# Patient Record
Sex: MALE | Race: BLACK OR AFRICAN AMERICAN | NOT HISPANIC OR LATINO | URBAN - METROPOLITAN AREA
[De-identification: names, ages, dates, MRNs, and addresses within clinical notes are randomized per-mention and may not be internally consistent; named-entity substitution may affect disease eponyms.]

---

## 2023-03-09 LAB
ALANINE AMINOTRANSFERASE (SGPT) (U/L) IN SER/PLAS: 10 U/L (ref 10–52)
ALBUMIN (G/DL) IN SER/PLAS: 4.1 G/DL (ref 3.4–5)
ALKALINE PHOSPHATASE (U/L) IN SER/PLAS: 88 U/L (ref 33–136)
ANION GAP IN SER/PLAS: 12 MMOL/L (ref 10–20)
ASPARTATE AMINOTRANSFERASE (SGOT) (U/L) IN SER/PLAS: 13 U/L (ref 9–39)
BASOPHILS (10*3/UL) IN BLOOD BY AUTOMATED COUNT: 0.03 X10E9/L (ref 0–0.1)
BASOPHILS/100 LEUKOCYTES IN BLOOD BY AUTOMATED COUNT: 0.5 % (ref 0–2)
BILIRUBIN TOTAL (MG/DL) IN SER/PLAS: 0.6 MG/DL (ref 0–1.2)
CALCIUM (MG/DL) IN SER/PLAS: 9.1 MG/DL (ref 8.6–10.6)
CARBON DIOXIDE, TOTAL (MMOL/L) IN SER/PLAS: 28 MMOL/L (ref 21–32)
CHLORIDE (MMOL/L) IN SER/PLAS: 101 MMOL/L (ref 98–107)
CHOLESTEROL (MG/DL) IN SER/PLAS: 113 MG/DL (ref 0–199)
CHOLESTEROL IN HDL (MG/DL) IN SER/PLAS: 30.3 MG/DL
CHOLESTEROL/HDL RATIO: 3.7
CREATININE (MG/DL) IN SER/PLAS: 0.75 MG/DL (ref 0.5–1.3)
EOSINOPHILS (10*3/UL) IN BLOOD BY AUTOMATED COUNT: 0.08 X10E9/L (ref 0–0.7)
EOSINOPHILS/100 LEUKOCYTES IN BLOOD BY AUTOMATED COUNT: 1.2 % (ref 0–6)
ERYTHROCYTE DISTRIBUTION WIDTH (RATIO) BY AUTOMATED COUNT: 12.5 % (ref 11.5–14.5)
ERYTHROCYTE MEAN CORPUSCULAR HEMOGLOBIN CONCENTRATION (G/DL) BY AUTOMATED: 34.3 G/DL (ref 32–36)
ERYTHROCYTE MEAN CORPUSCULAR VOLUME (FL) BY AUTOMATED COUNT: 87 FL (ref 80–100)
ERYTHROCYTES (10*6/UL) IN BLOOD BY AUTOMATED COUNT: 4.53 X10E12/L (ref 4.5–5.9)
ESTIMATED AVERAGE GLUCOSE FOR HBA1C: 131 MG/DL
GFR MALE: >90 ML/MIN/1.73M2
GLUCOSE (MG/DL) IN SER/PLAS: 178 MG/DL (ref 74–99)
HEMATOCRIT (%) IN BLOOD BY AUTOMATED COUNT: 39.4 % (ref 41–52)
HEMOGLOBIN (G/DL) IN BLOOD: 13.5 G/DL (ref 13.5–17.5)
HEMOGLOBIN A1C/HEMOGLOBIN TOTAL IN BLOOD: 6.2 %
IMMATURE GRANULOCYTES/100 LEUKOCYTES IN BLOOD BY AUTOMATED COUNT: 0.5 % (ref 0–0.9)
LDL: 59 MG/DL (ref 0–99)
LEUKOCYTES (10*3/UL) IN BLOOD BY AUTOMATED COUNT: 6.6 X10E9/L (ref 4.4–11.3)
LYMPHOCYTES (10*3/UL) IN BLOOD BY AUTOMATED COUNT: 1.37 X10E9/L (ref 1.2–4.8)
LYMPHOCYTES/100 LEUKOCYTES IN BLOOD BY AUTOMATED COUNT: 20.9 % (ref 13–44)
MAGNESIUM (MG/DL) IN SER/PLAS: 1.82 MG/DL (ref 1.6–2.4)
MONOCYTES (10*3/UL) IN BLOOD BY AUTOMATED COUNT: 0.61 X10E9/L (ref 0.1–1)
MONOCYTES/100 LEUKOCYTES IN BLOOD BY AUTOMATED COUNT: 9.3 % (ref 2–10)
NEUTROPHILS (10*3/UL) IN BLOOD BY AUTOMATED COUNT: 4.44 X10E9/L (ref 1.2–7.7)
NEUTROPHILS/100 LEUKOCYTES IN BLOOD BY AUTOMATED COUNT: 67.6 % (ref 40–80)
NRBC (PER 100 WBCS) BY AUTOMATED COUNT: 0 /100 WBC (ref 0–0)
PLATELETS (10*3/UL) IN BLOOD AUTOMATED COUNT: 198 X10E9/L (ref 150–450)
POTASSIUM (MMOL/L) IN SER/PLAS: 3.2 MMOL/L (ref 3.5–5.3)
PROTEIN TOTAL: 6.5 G/DL (ref 6.4–8.2)
SODIUM (MMOL/L) IN SER/PLAS: 138 MMOL/L (ref 136–145)
SYPHILIS TOTAL AB: REACTIVE
THYROTROPIN (MIU/L) IN SER/PLAS BY DETECTION LIMIT <= 0.05 MIU/L: 1.25 MIU/L (ref 0.44–3.98)
TRIGLYCERIDE (MG/DL) IN SER/PLAS: 120 MG/DL (ref 0–149)
UREA NITROGEN (MG/DL) IN SER/PLAS: 14 MG/DL (ref 6–23)
VLDL: 24 MG/DL (ref 0–40)

## 2023-03-10 LAB
RPR: ABNORMAL
SYPHILIS INTEGRATED INTERPRETATION: ABNORMAL
SYPHILIS TOTAL AB: REACTIVE
TREPONEMA PALLIDUM CONFIRMATION: NONREACTIVE
VDRL: NONREACTIVE

## 2023-07-05 LAB
ALANINE AMINOTRANSFERASE (SGPT) (U/L) IN SER/PLAS: 9 U/L (ref 10–52)
ALBUMIN (G/DL) IN SER/PLAS: 4.2 G/DL (ref 3.4–5)
ALKALINE PHOSPHATASE (U/L) IN SER/PLAS: 85 U/L (ref 33–136)
ANION GAP IN SER/PLAS: 16 MMOL/L (ref 10–20)
ASPARTATE AMINOTRANSFERASE (SGOT) (U/L) IN SER/PLAS: 13 U/L (ref 9–39)
BILIRUBIN TOTAL (MG/DL) IN SER/PLAS: 0.4 MG/DL (ref 0–1.2)
CALCIUM (MG/DL) IN SER/PLAS: 9.6 MG/DL (ref 8.6–10.6)
CARBON DIOXIDE, TOTAL (MMOL/L) IN SER/PLAS: 26 MMOL/L (ref 21–32)
CHLORIDE (MMOL/L) IN SER/PLAS: 102 MMOL/L (ref 98–107)
CREATININE (MG/DL) IN SER/PLAS: 0.8 MG/DL (ref 0.5–1.3)
ESTIMATED AVERAGE GLUCOSE FOR HBA1C: 111 MG/DL
GFR MALE: >90 ML/MIN/1.73M2
GLUCOSE (MG/DL) IN SER/PLAS: 82 MG/DL (ref 74–99)
HEMOGLOBIN A1C/HEMOGLOBIN TOTAL IN BLOOD: 5.5 %
HEPATITIS A VIRUS IGM AB PRESENCE IN SER/PLAS BY IMMUNOASSAY: NONREACTIVE
HEPATITIS B VIRUS CORE IGM AB PRESENCE IN SER/PLAS BY IMMUNOASSY: NONREACTIVE
HEPATITIS B VIRUS SURFACE AG PRESENCE IN SERUM: NONREACTIVE
HEPATITIS C VIRUS AB PRESENCE IN SERUM: NONREACTIVE
HIV 1/ 2 AG/AB SCREEN: NONREACTIVE
POTASSIUM (MMOL/L) IN SER/PLAS: 3.6 MMOL/L (ref 3.5–5.3)
PROTEIN TOTAL: 7 G/DL (ref 6.4–8.2)
SODIUM (MMOL/L) IN SER/PLAS: 140 MMOL/L (ref 136–145)
UREA NITROGEN (MG/DL) IN SER/PLAS: 22 MG/DL (ref 6–23)

## 2023-07-13 ENCOUNTER — APPOINTMENT (OUTPATIENT)
Dept: LAB | Facility: LAB | Age: 68
End: 2023-07-13
Payer: COMMERCIAL

## 2023-07-13 LAB
ALBUMIN (MG/L) IN URINE: <7 MG/L
ALBUMIN/CREATININE (UG/MG) IN URINE: NORMAL UG/MG CRT (ref 0–30)
CREATININE (MG/DL) IN URINE: 114 MG/DL (ref 20–370)

## 2024-03-15 ENCOUNTER — TELEPHONE (OUTPATIENT)
Dept: PRIMARY CARE | Facility: HOSPITAL | Age: 69
End: 2024-03-15
Payer: MEDICARE

## 2024-03-15 DIAGNOSIS — E11.9 TYPE 2 DIABETES MELLITUS WITHOUT COMPLICATION, WITHOUT LONG-TERM CURRENT USE OF INSULIN (MULTI): Primary | ICD-10-CM

## 2024-03-15 DIAGNOSIS — E11.9 TYPE 2 DIABETES MELLITUS WITHOUT COMPLICATION, WITHOUT LONG-TERM CURRENT USE OF INSULIN (MULTI): ICD-10-CM

## 2024-03-15 RX ORDER — METFORMIN HYDROCHLORIDE 500 MG/1
500 TABLET ORAL
Qty: 100 TABLET | Refills: 3 | Status: SHIPPED | OUTPATIENT
Start: 2024-03-15 | End: 2024-03-19 | Stop reason: SDUPTHER

## 2024-03-15 NOTE — TELEPHONE ENCOUNTER
Patient's care taker called to state patient is completely out of Metformin    [facility]

## 2024-03-19 RX ORDER — METFORMIN HYDROCHLORIDE 500 MG/1
500 TABLET ORAL 2 TIMES DAILY
Qty: 60 TABLET | Refills: 0 | Status: SHIPPED | OUTPATIENT
Start: 2024-03-19 | End: 2024-03-29 | Stop reason: SDUPTHER

## 2024-03-19 NOTE — TELEPHONE ENCOUNTER
"Spoke with care provider at group home. Nurse said pt taking 500 mg BID. Per last note in chart, mentioned \"starting Metformin\". Unclear exactly what dose is (refill from earlier in March says 500 mg every day whereas nurse at home says 500 mg BID. Last A1c 5.5. Will order 500 mg BID for 1 month. Has follow up later in March 2024 scheduled. At that time can recheck A1c and determine optimal dose    Signed,  Onel Padilla MD PGY-2  Internal Medicine-Pediatrics    "

## 2024-03-19 NOTE — TELEPHONE ENCOUNTER
Patient staff called stating the patient takes metformin twice daily prescription was sent for morning only

## 2024-03-29 ENCOUNTER — LAB (OUTPATIENT)
Dept: LAB | Facility: LAB | Age: 69
End: 2024-03-29
Payer: MEDICARE

## 2024-03-29 ENCOUNTER — OFFICE VISIT (OUTPATIENT)
Dept: PRIMARY CARE | Facility: HOSPITAL | Age: 69
End: 2024-03-29
Payer: MEDICARE

## 2024-03-29 VITALS
SYSTOLIC BLOOD PRESSURE: 146 MMHG | HEART RATE: 86 BPM | HEIGHT: 63 IN | DIASTOLIC BLOOD PRESSURE: 78 MMHG | TEMPERATURE: 96.9 F | BODY MASS INDEX: 31.36 KG/M2 | OXYGEN SATURATION: 95 % | WEIGHT: 177 LBS

## 2024-03-29 DIAGNOSIS — R39.15 URINARY URGENCY: ICD-10-CM

## 2024-03-29 DIAGNOSIS — E11.9 TYPE 2 DIABETES MELLITUS WITHOUT COMPLICATION, WITHOUT LONG-TERM CURRENT USE OF INSULIN (MULTI): ICD-10-CM

## 2024-03-29 DIAGNOSIS — Z00.00 HEALTHCARE MAINTENANCE: Primary | ICD-10-CM

## 2024-03-29 DIAGNOSIS — I10 HYPERTENSION, UNSPECIFIED TYPE: ICD-10-CM

## 2024-03-29 DIAGNOSIS — Z00.00 HEALTHCARE MAINTENANCE: ICD-10-CM

## 2024-03-29 LAB
ALBUMIN SERPL BCP-MCNC: 4.3 G/DL (ref 3.4–5)
ALP SERPL-CCNC: 79 U/L (ref 33–136)
ALT SERPL W P-5'-P-CCNC: 12 U/L (ref 10–52)
ANION GAP SERPL CALC-SCNC: 14 MMOL/L (ref 10–20)
APPEARANCE UR: CLEAR
AST SERPL W P-5'-P-CCNC: 12 U/L (ref 9–39)
BILIRUB SERPL-MCNC: 0.5 MG/DL (ref 0–1.2)
BILIRUB UR STRIP.AUTO-MCNC: NEGATIVE MG/DL
BUN SERPL-MCNC: 12 MG/DL (ref 6–23)
CALCIUM SERPL-MCNC: 9.3 MG/DL (ref 8.6–10.6)
CHLORIDE SERPL-SCNC: 101 MMOL/L (ref 98–107)
CO2 SERPL-SCNC: 27 MMOL/L (ref 21–32)
COLOR UR: YELLOW
CREAT SERPL-MCNC: 0.76 MG/DL (ref 0.5–1.3)
EGFRCR SERPLBLD CKD-EPI 2021: >90 ML/MIN/1.73M*2
ERYTHROCYTE [DISTWIDTH] IN BLOOD BY AUTOMATED COUNT: 13 % (ref 11.5–14.5)
EST. AVERAGE GLUCOSE BLD GHB EST-MCNC: 117 MG/DL
GLUCOSE SERPL-MCNC: 169 MG/DL (ref 74–99)
GLUCOSE UR STRIP.AUTO-MCNC: ABNORMAL MG/DL
HBA1C MFR BLD: 5.7 %
HCT VFR BLD AUTO: 38.4 % (ref 41–52)
HGB BLD-MCNC: 13.5 G/DL (ref 13.5–17.5)
KETONES UR STRIP.AUTO-MCNC: NEGATIVE MG/DL
LEUKOCYTE ESTERASE UR QL STRIP.AUTO: NEGATIVE
MCH RBC QN AUTO: 31.1 PG (ref 26–34)
MCHC RBC AUTO-ENTMCNC: 35.2 G/DL (ref 32–36)
MCV RBC AUTO: 89 FL (ref 80–100)
NITRITE UR QL STRIP.AUTO: NEGATIVE
NRBC BLD-RTO: 0 /100 WBCS (ref 0–0)
PH UR STRIP.AUTO: 6 [PH]
PLATELET # BLD AUTO: 172 X10*3/UL (ref 150–450)
POTASSIUM SERPL-SCNC: 3.3 MMOL/L (ref 3.5–5.3)
PROT SERPL-MCNC: 6.8 G/DL (ref 6.4–8.2)
PROT UR STRIP.AUTO-MCNC: NEGATIVE MG/DL
RBC # BLD AUTO: 4.34 X10*6/UL (ref 4.5–5.9)
RBC # UR STRIP.AUTO: NEGATIVE /UL
SODIUM SERPL-SCNC: 139 MMOL/L (ref 136–145)
SP GR UR STRIP.AUTO: 1.02
UROBILINOGEN UR STRIP.AUTO-MCNC: ABNORMAL MG/DL
WBC # BLD AUTO: 6.3 X10*3/UL (ref 4.4–11.3)

## 2024-03-29 PROCEDURE — 1159F MED LIST DOCD IN RCRD: CPT

## 2024-03-29 PROCEDURE — 36415 COLL VENOUS BLD VENIPUNCTURE: CPT

## 2024-03-29 PROCEDURE — 85027 COMPLETE CBC AUTOMATED: CPT

## 2024-03-29 PROCEDURE — 99214 OFFICE O/P EST MOD 30 MIN: CPT

## 2024-03-29 PROCEDURE — 1126F AMNT PAIN NOTED NONE PRSNT: CPT

## 2024-03-29 PROCEDURE — 3077F SYST BP >= 140 MM HG: CPT

## 2024-03-29 PROCEDURE — 1160F RVW MEDS BY RX/DR IN RCRD: CPT

## 2024-03-29 PROCEDURE — 3044F HG A1C LEVEL LT 7.0%: CPT

## 2024-03-29 PROCEDURE — 3008F BODY MASS INDEX DOCD: CPT

## 2024-03-29 PROCEDURE — 1036F TOBACCO NON-USER: CPT

## 2024-03-29 PROCEDURE — 80053 COMPREHEN METABOLIC PANEL: CPT

## 2024-03-29 PROCEDURE — 99214 OFFICE O/P EST MOD 30 MIN: CPT | Mod: GC

## 2024-03-29 PROCEDURE — 83036 HEMOGLOBIN GLYCOSYLATED A1C: CPT

## 2024-03-29 PROCEDURE — 81003 URINALYSIS AUTO W/O SCOPE: CPT

## 2024-03-29 PROCEDURE — 3078F DIAST BP <80 MM HG: CPT

## 2024-03-29 RX ORDER — RISPERIDONE 0.5 MG/1
0.5 TABLET ORAL NIGHTLY
COMMUNITY
Start: 2024-03-07 | End: 2024-03-29 | Stop reason: SDUPTHER

## 2024-03-29 RX ORDER — METFORMIN HYDROCHLORIDE 500 MG/1
500 TABLET ORAL 2 TIMES DAILY
Qty: 60 TABLET | Refills: 11 | Status: SHIPPED | OUTPATIENT
Start: 2024-03-29 | End: 2025-03-29

## 2024-03-29 RX ORDER — AMLODIPINE BESYLATE 10 MG/1
10 TABLET ORAL DAILY
Qty: 30 TABLET | Refills: 11 | Status: SHIPPED | OUTPATIENT
Start: 2024-03-29 | End: 2025-03-29

## 2024-03-29 RX ORDER — ATORVASTATIN CALCIUM 20 MG/1
20 TABLET, FILM COATED ORAL NIGHTLY
COMMUNITY
Start: 2020-03-10 | End: 2024-03-29 | Stop reason: SDUPTHER

## 2024-03-29 RX ORDER — CHLORTHALIDONE 25 MG/1
25 TABLET ORAL DAILY
COMMUNITY
Start: 2022-03-30 | End: 2024-03-29 | Stop reason: SDUPTHER

## 2024-03-29 RX ORDER — RISPERIDONE 0.5 MG/1
0.5 TABLET ORAL NIGHTLY
Qty: 30 TABLET | Refills: 11 | Status: SHIPPED | OUTPATIENT
Start: 2024-03-29 | End: 2025-03-29

## 2024-03-29 RX ORDER — CHLORTHALIDONE 25 MG/1
25 TABLET ORAL DAILY
Qty: 30 TABLET | Refills: 11 | Status: SHIPPED | OUTPATIENT
Start: 2024-03-29 | End: 2025-03-29

## 2024-03-29 RX ORDER — AMLODIPINE BESYLATE 10 MG/1
10 TABLET ORAL DAILY
COMMUNITY
Start: 2020-03-10 | End: 2024-03-29 | Stop reason: SDUPTHER

## 2024-03-29 RX ORDER — TROSPIUM CHLORIDE 20 MG/1
20 TABLET, FILM COATED ORAL NIGHTLY
Qty: 30 TABLET | Refills: 5 | Status: SHIPPED | OUTPATIENT
Start: 2024-03-29 | End: 2024-09-25

## 2024-03-29 RX ORDER — TROSPIUM CHLORIDE 20 MG/1
20 TABLET, FILM COATED ORAL DAILY
Qty: 30 TABLET | Refills: 5 | Status: SHIPPED | OUTPATIENT
Start: 2024-03-29 | End: 2024-03-29 | Stop reason: SDUPTHER

## 2024-03-29 RX ORDER — ATORVASTATIN CALCIUM 20 MG/1
20 TABLET, FILM COATED ORAL NIGHTLY
Qty: 30 TABLET | Refills: 11 | Status: SHIPPED | OUTPATIENT
Start: 2024-03-29 | End: 2025-03-29

## 2024-03-29 ASSESSMENT — PATIENT HEALTH QUESTIONNAIRE - PHQ9
SUM OF ALL RESPONSES TO PHQ9 QUESTIONS 1 & 2: 0
2. FEELING DOWN, DEPRESSED OR HOPELESS: NOT AT ALL
1. LITTLE INTEREST OR PLEASURE IN DOING THINGS: NOT AT ALL

## 2024-03-29 ASSESSMENT — ENCOUNTER SYMPTOMS
OCCASIONAL FEELINGS OF UNSTEADINESS: 0
DEPRESSION: 0
LOSS OF SENSATION IN FEET: 0

## 2024-03-29 ASSESSMENT — PAIN SCALES - GENERAL: PAINLEVEL: 0-NO PAIN

## 2024-03-29 NOTE — PATIENT INSTRUCTIONS
Hi Mr Valles,       Thanks for coming in, these are some important things to remember from your visit:   1) I ordered you some lab work today, please make sure to get those labs drawn at a  lab.   2) I have ordered you a urine lab to make sure there is no infection. If there is none, we will prescribe a medication to help with urinary urgency. Please also make sure to document every time you have an episode of urination and you don't make it to the toilet in time. Please also try to time your voids - try to go to the bathroom at least 4 times daily so that you are not rushing to go any other time.   3) Please come back for follow-up visit in 6 months.  4) Please let us know if you have any questions; you can call us at      Thanks,    Dr. Ricci

## 2024-03-29 NOTE — PROGRESS NOTES
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 Pt was last seen in clinic July 2023.      Pt states he is doing fine, he has no concerns. He is here with 2 of his caretakers. They are requesting medication refills.     Pt states that when he has to go to bathroom, he sometimes urinates before getting to toilet. His caretakers have noticed this as well.     ROS: 12 point ROS negative unless as per HPI         Current Outpatient Medications:     amLODIPine (Norvasc) 10 mg tablet, Take 1 tablet (10 mg) by mouth once daily., Disp: 30 tablet, Rfl: 11    atorvastatin (Lipitor) 20 mg tablet, Take 1 tablet (20 mg) by mouth once daily at bedtime., Disp: 30 tablet, Rfl: 11    chlorthalidone (Hygroton) 25 mg tablet, Take 1 tablet (25 mg) by mouth once daily., Disp: 30 tablet, Rfl: 11    metFORMIN (Glucophage) 500 mg tablet, Take 1 tablet (500 mg) by mouth 2 times a day., Disp: 60 tablet, Rfl: 11    risperiDONE (RisperDAL) 0.5 mg tablet, Take 1 tablet (0.5 mg) by mouth once daily at bedtime., Disp: 30 tablet, Rfl: 11     PHYSICAL EXAM:   General: pt is pleasant, cooperative, in NAD  Heart: RRR, no murmur, rub or Jeancarlos  Lungs: clear to auscultation bilaterally with good airflow throughout. No wheezes or rhonchi.  Abdomen: soft, nontender, nondistended, no apparent mass  Extremities: no edema bilaterally  Skin: no rash or lesions; warm and dry   Psychiatric: mental status and behavior appropriate for situation   Neurologic: Normal gait and stance. Normal speech character and tone. No apparent focal deficits.       Musculoskeletal: moving all extremities appropriately.    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 Pt was  last seen in clinic July 2023.         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      #HTN  -slightly elevated bp today (146/78), normal at last visit   -will re-assess BP at next visit   -c/w amlodipine 10mg  -c/w chlorthalidone 25mg daily   -both above meds refilled today      #Dyslipidemia  -cont atorvastatin 20 mg - refilled today      #pre-diabetes   ::latest A1c 5.5% (07/23)  -c/w metformin 500 mg BID - refilled today      #Hx Hallucination & pedophilia   -followed by psychiatry (Nery Monteiro at [facility], last seen in April 2022)  -on risperidone 0.5 mg - refilled today       #HM   -PCV 3/2020   -COVID vaccines x2   -colonoscopy done 2020, next colonoscopy 2025  -Lung cancer screening: not indicated, never smoker   -A1C: 5.5 (7/23)   -Tspot negative 08/21 (for adult  clearance)  -Influenza: Last Nov 2020   -TDAP: 2021   -HIV : neg (7/2023)  -Hep B+C: neg (7/2023)       Return to clinic in 6 months for follow-up         Blanquita Ricci MD   IM PGY-2

## 2024-03-31 NOTE — PROGRESS NOTES
I reviewed the resident/fellow's documentation and discussed the patient with the resident/fellow. I agree with the resident/fellow's medical decision making as documented in the note.     Courtney Hutchinson MD MPH       no

## 2024-09-18 ENCOUNTER — OFFICE VISIT (OUTPATIENT)
Dept: PRIMARY CARE | Facility: HOSPITAL | Age: 69
End: 2024-09-18
Payer: MEDICARE

## 2024-09-18 ENCOUNTER — DOCUMENTATION (OUTPATIENT)
Dept: PRIMARY CARE | Facility: HOSPITAL | Age: 69
End: 2024-09-18
Payer: MEDICARE

## 2024-09-18 VITALS
HEIGHT: 64 IN | TEMPERATURE: 96.6 F | OXYGEN SATURATION: 98 % | BODY MASS INDEX: 29.88 KG/M2 | HEART RATE: 86 BPM | SYSTOLIC BLOOD PRESSURE: 136 MMHG | DIASTOLIC BLOOD PRESSURE: 74 MMHG | WEIGHT: 175 LBS

## 2024-09-18 DIAGNOSIS — Z00.00 HEALTHCARE MAINTENANCE: Primary | ICD-10-CM

## 2024-09-18 PROCEDURE — 1123F ACP DISCUSS/DSCN MKR DOCD: CPT

## 2024-09-18 PROCEDURE — 3008F BODY MASS INDEX DOCD: CPT

## 2024-09-18 PROCEDURE — 1126F AMNT PAIN NOTED NONE PRSNT: CPT

## 2024-09-18 PROCEDURE — 1158F ADVNC CARE PLAN TLK DOCD: CPT

## 2024-09-18 PROCEDURE — 99213 OFFICE O/P EST LOW 20 MIN: CPT | Mod: GC

## 2024-09-18 PROCEDURE — 99213 OFFICE O/P EST LOW 20 MIN: CPT

## 2024-09-18 PROCEDURE — 1159F MED LIST DOCD IN RCRD: CPT

## 2024-09-18 PROCEDURE — 1036F TOBACCO NON-USER: CPT

## 2024-09-18 ASSESSMENT — ENCOUNTER SYMPTOMS
LOSS OF SENSATION IN FEET: 0
OCCASIONAL FEELINGS OF UNSTEADINESS: 0
DEPRESSION: 0

## 2024-09-18 ASSESSMENT — PATIENT HEALTH QUESTIONNAIRE - PHQ9
1. LITTLE INTEREST OR PLEASURE IN DOING THINGS: NOT AT ALL
2. FEELING DOWN, DEPRESSED OR HOPELESS: NOT AT ALL
SUM OF ALL RESPONSES TO PHQ9 QUESTIONS 1 AND 2: 0

## 2024-09-18 ASSESSMENT — PAIN SCALES - GENERAL: PAINLEVEL: 0-NO PAIN

## 2024-09-18 NOTE — PROGRESS NOTES
Advanced care planning discussed at this visit.  Patient does not currently have a Healthcare Power of  or Living Will in place. He does express that his sister Toya Valles has his permission to act as his surrogate decision maker in the event of an emergency. Patient advised to bring in POA, Living Will and Advanced Care Plan for his chart if he  obtains one.  Patient declined POA & Living Will documentation as well as an informational sheet today.

## 2024-09-18 NOTE — PROGRESS NOTES
Chief complaint: Follow Up Visit    HPI:  Fabio Valles is a 69 y.o. 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 Pt was last seen in clinic on March 2024. At that visit his chief complaint was urinary incontinence at night for which he was prescribed Trospium 20mg at bedtime. Today he reports that this has adequately resolved his symptoms. No episodes of nighttime urination.     Otherwise Mr. Valles has no complaints. He is here for follow up maintenance visit.     Medications:    Current Outpatient Medications:     amLODIPine (Norvasc) 10 mg tablet, Take 1 tablet (10 mg) by mouth once daily., Disp: 30 tablet, Rfl: 11    atorvastatin (Lipitor) 20 mg tablet, Take 1 tablet (20 mg) by mouth once daily at bedtime., Disp: 30 tablet, Rfl: 11    chlorthalidone (Hygroton) 25 mg tablet, Take 1 tablet (25 mg) by mouth once daily., Disp: 30 tablet, Rfl: 11    risperiDONE (RisperDAL) 0.5 mg tablet, Take 1 tablet (0.5 mg) by mouth once daily at bedtime., Disp: 30 tablet, Rfl: 11    trospium (Sanctura) 20 mg tablet, TAKE 1 TAB BY MOUTH EVERY DAY AT BEDTIME, Disp: 20 tablet, Rfl: 10    Allergies:  Not on File    Review of systems:  Constitutional: negative for fevers, chills, weight loss, weight gain, change in appetite, fatigue, weakness.  HEENT: negative for headache, changes in vision or hearing, congestion, sore throat.  Respiratory: negative for SOB, cough, hemoptysis, wheezing  Cardiovascular: negative for chest pain, palpitations, orthopnea, PND  GI: negative for dysphagia, abdominal pain, nausea, vomiting, diarrhea, constipation, melena, hematochezia, BRBPR  : negative for frequency, urgency, dysuria, hematuria, incontinence  MSK: negative for myalgia, arthralgia, decreased joint ROM, LE swelling  Skin: negative for rash, wounds  Heme/lymph: negative for easy bruising, bleeding, epistaxis  Neuro: negative for LOC, numbness,  tingling, tremor, vertigo, dizziness    Vitals:  Vitals:    09/18/24 1357   BP: 136/74   Pulse: 86   Temp: 35.9 °C (96.6 °F)   SpO2: 98%       Physical exam:  Constitutional: Well-developed male in no acute distress.  HEENT: Normocephalic, atraumatic. PERRL. EOMI. No cervical lymphadenopathy.  Respiratory: CTA bilaterally. No wheezes, rales, or rhonchi. Normal respiratory effort.  Cardiovascular: RRR. No murmurs, gallops, or rubs. No JVD. Radial pulses 2+.  Abdominal: Soft, nondistended, nontender to palpation. Bowel sounds present. No hepatosplenomegaly or masses. No CVA tenderness.  Neuro: CN II-XII intact. UE and LE strength 5/5 bilaterally and sensation intact. Normal FTN testing.  MSK: No LE edema bilaterally.  Skin: Warm, dry. No rashes or wounds.  Psych: Appropriate mood and affect.    Labs:  No results found for this or any previous visit (from the past 24 hour(s)).    Imaging:  No results found.    Assessment and plan:  Fabio Valles is a 69 y.o. 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 Pt was last seen in clinic on March 2024 for which he was px Trospium for night time incontinence. Today he has no complaints, and incontinence has improved.     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 loss component  Left ear Sensorineural hearing loss with restricted hearing right ear  - R>L, ear canals clear  - No concerns today in office regarding his hearing    #HTN  -slightly elevated bp today (136/74), 146/78 at last visit  - Home BP log   -c/w amlodipine 10mg  -c/w chlorthalidone 25mg daily      #Dyslipidemia  - Lipids last checked 6/2024, LDL 50, HDL 27  - Cont atorvastatin 20 mg      #pre-diabetes   ::latest A1c 5.5% (07/23)  -  Discontinue metformin 500 mg BID     #Hx Hallucination & pedophilia   -followed by psychiatry (Nery Pereirae at [facility], last seen in June 2024)  -on risperidone 0.5 mg       #Health Maintenance  #Screening:  -Cardiovascular: 10 year risk 20.7%. Patient is on anti-HTN and statin medications per guidelines. Advised patient to emphasize whole food diet and limit processed food intake.   -Diabetes: A1C 5.7 (3/2024)  -Cancer:   Colorectal: Due for Cscope in 2025  Lung: N/A  #Behavioral Counseling:   Tobacco/smoking: Never smoker  Alcohol: Never drinker  Diet/exercise: No exercise, diet balanced  #Immunizations:  - Pneumococcal: [id number] dose completed 3/2020  - Flu: Will get at home pharmacy along with Shingrix and RSV  - COVID: Primary series completed  - RSV: Will consider at pharmacy today  - Tdap: Up to date, 2021  - Zoster: Encouraged to get at pharmacy    RTC in  6 months.    Patient and plan discussed with attending physician, Dr. Terrell .    Lexa Rees MD  PGY-1 Internal Medicine  [facility]

## 2024-09-18 NOTE — PATIENT INSTRUCTIONS
Mr. Valles,    As discussed today, our plan is:     Labs - we collected labs today and will call you with any abnormal results. If you have any questions or concerns prior to us calling feel free to call our office to have your questions addressed.   Medication changes: Discontinued Metformin.  Consultations - you were referred to see the following specialists: N/A. You should receive a call from central scheduling in the next few days if you do not receive a call within 3-5 business days please call [phone] to schedule your appointment.   4.   If you smoke or use other tobacco products, take steps to quit. Call [phone] for more information or to set up an appointment with  [facility]ing [facility]. The [place] Tobacco Quit Line is a free resource for people who don’t have insurance, receive Medicaid, pregnant women, or members of the [place] Collabor[place]. Call 0-447-QUIT-NOW or 1-663.665.3201.    Please come back to see us in: 6 months   ------  If you have any problems or questions, please contact the clinic at [phone] to leave a message. Our fax number is [phone]. If your issue cannot wait until the next business day, please go to urgent care or the emergency department.     I also strongly urge all of my patients to register for [employer] by going to: [email or web address]  (The  staff can also send you a text/email link to register when you check out).    No shows: It is understandable if you are unable to make it to a visit, but please cancel your appointment instead of not showing up. This helps to give other patients access to primary care and keeps wait times low.        [facility]   [phone]    unable to assess

## 2024-12-06 ENCOUNTER — TELEPHONE (OUTPATIENT)
Dept: PRIMARY CARE | Facility: HOSPITAL | Age: 69
End: 2024-12-06
Payer: MEDICARE

## 2024-12-06 NOTE — TELEPHONE ENCOUNTER
Attempted to call the patient but was unable to get through.     I was requested to order metformin 500mg but based on chart review, it is unclear whether patient has been taking this or not, and at what dose. Per last note, this was discontinued, and he has not had labs drawn in a while.     I will request our clinic staff to schedule an appointment with him so that he can have labs drawn and the optimal metformin dose can be determined.     Ely Brink MD  PGY2 Internal Medicine  [facility]

## 2024-12-12 ENCOUNTER — APPOINTMENT (OUTPATIENT)
Dept: PRIMARY CARE | Facility: HOSPITAL | Age: 69
End: 2024-12-12
Payer: MEDICARE

## 2025-01-07 ENCOUNTER — OFFICE VISIT (OUTPATIENT)
Dept: PRIMARY CARE | Facility: HOSPITAL | Age: 70
End: 2025-01-07
Payer: MEDICARE

## 2025-01-07 VITALS
SYSTOLIC BLOOD PRESSURE: 146 MMHG | HEART RATE: 101 BPM | BODY MASS INDEX: 29.88 KG/M2 | WEIGHT: 175 LBS | OXYGEN SATURATION: 98 % | HEIGHT: 64 IN | TEMPERATURE: 96.7 F | DIASTOLIC BLOOD PRESSURE: 84 MMHG

## 2025-01-07 DIAGNOSIS — N39.42 URINARY INCONTINENCE WITHOUT SENSORY AWARENESS: ICD-10-CM

## 2025-01-07 DIAGNOSIS — R73.03 PRE-DIABETES: ICD-10-CM

## 2025-01-07 DIAGNOSIS — I10 HYPERTENSION, UNSPECIFIED TYPE: Primary | ICD-10-CM

## 2025-01-07 LAB
APPEARANCE UR: CLEAR
BACTERIA #/AREA URNS AUTO: ABNORMAL /HPF
BILIRUB UR STRIP.AUTO-MCNC: NEGATIVE MG/DL
COLOR UR: ABNORMAL
EST. AVERAGE GLUCOSE BLD GHB EST-MCNC: 381 MG/DL
GLUCOSE UR STRIP.AUTO-MCNC: ABNORMAL MG/DL
HBA1C MFR BLD: 14.9 %
KETONES UR STRIP.AUTO-MCNC: NEGATIVE MG/DL
LEUKOCYTE ESTERASE UR QL STRIP.AUTO: ABNORMAL
MUCOUS THREADS #/AREA URNS AUTO: ABNORMAL /LPF
NITRITE UR QL STRIP.AUTO: NEGATIVE
PH UR STRIP.AUTO: 5.5 [PH]
PROT UR STRIP.AUTO-MCNC: NEGATIVE MG/DL
RBC # UR STRIP.AUTO: ABNORMAL /UL
RBC #/AREA URNS AUTO: >20 /HPF
SP GR UR STRIP.AUTO: 1.03
SQUAMOUS #/AREA URNS AUTO: ABNORMAL /HPF
UROBILINOGEN UR STRIP.AUTO-MCNC: NORMAL MG/DL
WBC #/AREA URNS AUTO: ABNORMAL /HPF
YEAST BUDDING #/AREA UR COMP ASSIST: PRESENT /HPF

## 2025-01-07 PROCEDURE — 87086 URINE CULTURE/COLONY COUNT: CPT

## 2025-01-07 PROCEDURE — 83036 HEMOGLOBIN GLYCOSYLATED A1C: CPT

## 2025-01-07 PROCEDURE — 36415 COLL VENOUS BLD VENIPUNCTURE: CPT

## 2025-01-07 PROCEDURE — 81001 URINALYSIS AUTO W/SCOPE: CPT

## 2025-01-07 RX ORDER — TAMSULOSIN HYDROCHLORIDE 0.4 MG/1
0.4 CAPSULE ORAL DAILY
Qty: 30 CAPSULE | Refills: 11 | Status: SHIPPED | OUTPATIENT
Start: 2025-01-07 | End: 2026-01-07

## 2025-01-07 RX ORDER — LOSARTAN POTASSIUM 25 MG/1
25 TABLET ORAL DAILY
Qty: 30 TABLET | Refills: 11 | Status: SHIPPED | OUTPATIENT
Start: 2025-01-07 | End: 2025-01-07

## 2025-01-07 RX ORDER — LOSARTAN POTASSIUM 25 MG/1
50 TABLET ORAL DAILY
Qty: 60 TABLET | Refills: 11 | Status: SHIPPED | OUTPATIENT
Start: 2025-01-07 | End: 2026-01-07

## 2025-01-07 ASSESSMENT — PATIENT HEALTH QUESTIONNAIRE - PHQ9
2. FEELING DOWN, DEPRESSED OR HOPELESS: NOT AT ALL
1. LITTLE INTEREST OR PLEASURE IN DOING THINGS: NOT AT ALL
SUM OF ALL RESPONSES TO PHQ9 QUESTIONS 1 AND 2: 0

## 2025-01-07 ASSESSMENT — ENCOUNTER SYMPTOMS
LOSS OF SENSATION IN FEET: 0
DEPRESSION: 0
OCCASIONAL FEELINGS OF UNSTEADINESS: 0

## 2025-01-07 ASSESSMENT — PAIN SCALES - GENERAL: PAINLEVEL_OUTOF10: 0-NO PAIN

## 2025-01-07 NOTE — PROGRESS NOTES
Chief complaint:    HPI:  Fabio Valles is a 69 y.o. 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 Per care taker, he will go often during day and night, sometimes up to 3 times per hour. They often find him soiled.        Medications:    Current Outpatient Medications:     amLODIPine (Norvasc) 10 mg tablet, Take 1 tablet (10 mg) by mouth once daily., Disp: 30 tablet, Rfl: 11    atorvastatin (Lipitor) 20 mg tablet, Take 1 tablet (20 mg) by mouth once daily at bedtime., Disp: 30 tablet, Rfl: 11    losartan (Cozaar) 25 mg tablet, Take 2 tablets (50 mg) by mouth once daily., Disp: 60 tablet, Rfl: 11    risperiDONE (RisperDAL) 0.5 mg tablet, Take 1 tablet (0.5 mg) by mouth once daily at bedtime., Disp: 30 tablet, Rfl: 11    tamsulosin (Flomax) 0.4 mg 24 hr capsule, Take 1 capsule (0.4 mg) by mouth once daily., Disp: 30 capsule, Rfl: 11    trospium (Sanctura) 20 mg tablet, TAKE 1 TAB BY MOUTH EVERY DAY AT BEDTIME, Disp: 20 tablet, Rfl: 10    Allergies:  No Known Allergies    Review of systems:  Constitutional: negative for fevers, chills, weight loss, weight gain, change in appetite, fatigue, weakness.  HEENT: negative for headache, changes in vision or hearing, congestion, sore throat.  Respiratory: negative for SOB, cough, hemoptysis, wheezing  Cardiovascular: negative for chest pain, palpitations, orthopnea, PND  GI: negative for dysphagia, abdominal pain, nausea, vomiting, diarrhea, constipation, melena, hematochezia, BRBPR  : negative for frequency, urgency, dysuria, hematuria, incontinence  MSK: negative for myalgia, arthralgia, decreased joint ROM, LE swelling  Skin: negative for rash, wounds  Heme/lymph: negative for easy bruising, bleeding, epistaxis  Neuro: negative for LOC, numbness, tingling, tremor, vertigo, dizziness    Vitals:  Vitals:    01/07/25 1436   BP: 146/84    Pulse: 101   Temp: 35.9 °C (96.7 °F)   SpO2: 98%       Physical exam:  Constitutional: Well-developed male in no acute distress.  HEENT: Normocephalic, atraumatic. PERRL. EOMI. No cervical lymphadenopathy.  Respiratory: CTA bilaterally. No wheezes, rales, or rhonchi. Normal respiratory effort.  Cardiovascular: RRR. No murmurs, gallops, or rubs. No JVD. Radial pulses 2+.  Abdominal: Soft, nondistended, nontender to palpation. Bowel sounds present. No hepatosplenomegaly or masses. No CVA tenderness.  Neuro: CN II-XII intact. UE and LE strength 5/5 bilaterally and sensation intact. Normal FTN testing.  MSK: No LE edema bilaterally.  Skin: Warm, dry. No rashes or wounds.  Psych: Appropriate mood and affect.    Labs:  No results found for this or any previous visit (from the past 24 hours).    Imaging:  No results found.    Assessment and plan:  Fabio Valles is a 69 y.o. 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 Per care taker, he will go often during day and night, sometimes up to 3 times per hour. They often find him soiled. Etiology is likely multifactorial with inability to completely rule out overactive bladder vs. BPH vs. UTI.     Changes/Orders today:  - Start Losartan 25mg BID  - Discontinue Chlorthalidone 25mg daily  - Start Tamulosin 0.4mg nightly  - Urinalysis with reflex to culture  - Recheck A1C     #Urinary Urgency   - Since last visit incontinence has worsened  - Patient has polyuria and increased frequency, with nocturia  - Denies dysuria, foul smelling urine  PLAN:  - Continue Trospium at night time  - UA with reflex to culture  -  Start Tamulosin 0.4mg nightly  - Discontinue Chlorthalidone       #HTN  -slightly elevated bp today (136/74), 146/78 at last visit  - Home BP log   PLAN:  -c/w amlodipine 10mg  - Start taking Losartan 25 mg BID  -Discontinue  chlorthalidone 25mg daily      #Dyslipidemia  - Lipids last checked 6/2024, LDL 50, HDL 27  PLAN:  - Cont atorvastatin 20 mg      #pre-diabetes   ::latest A1c 5.5% (07/23)  PLAN:  - Recheck A1C today  - Discontinue metformin 500 mg BID    #Hearing loss  -Follows w ENT, last seen in April 2023: Mixed hearing loss right ear with greater conductive hearing loss component  Left ear Sensorineural hearing loss with restricted hearing right ear  - R>L, ear canals clear  - No concerns today in office regarding his hearing       #Hx Hallucination & pedophilia   -followed by psychiatry (Nery Monteiro at [facility], last seen in June 2024)  -on risperidone 0.5 mg       #Health Maintenance  #Screening:  -Cardiovascular: 10 year risk 20.7%. Patient is on anti-HTN and statin medications per guidelines. Advised patient to emphasize whole food diet and limit processed food intake.   -Diabetes: A1C 5.7 (3/2024)  -Cancer:   Colorectal: Due for Cscope in 2025  Lung: N/A  #Behavioral Counseling:   Tobacco/smoking: Never smoker  Alcohol: Never drinker  Diet/exercise: No exercise, diet balanced  #Immunizations:  - Pneumococcal: [id number] dose completed 3/2020  - Flu: Will get at home pharmacy along with Shingrix and RSV  - COVID: Primary series completed  - RSV: Will consider at pharmacy today  - Tdap: Up to date, 2021  - Zoster: Encouraged to get at pharmacy     Follow up visit in 2 weeks to assess symptoms after changes made today.     Patient and plan discussed with attending physician, Dr. Terrell .     Lexa Rees MD  PGY-1 Internal Medicine  [facility]

## 2025-01-07 NOTE — PATIENT INSTRUCTIONS
As discussed today, our plan is:     Labs - we collected labs today and will call you with any abnormal results. If you have any questions or concerns prior to us calling feel free to call our office to have your questions addressed.   Medication changes: STOP taking Chlorthalidone 25mg. START taking Losartan 25 mg pill twice per day, one in AM and one in PM. START taking Tamsulosin (FLOmax) 0.4mg nightly.   Consultations - you were not referred to see specialists.  4.   If you smoke or use other tobacco products, take steps to quit. Call [phone] for more information or to set up an appointment with  T[facility] Counseling [facility]. The [place] Tobacco Quit Line is a free resource for people who don’t have insurance, receive Medicaid, pregnant women, or members of the [place] Collabor[place]. Call 6-282-QUIT-NOW or 1-811.325.7292.    Please come back to see us in: 2 weeks   ------  If you have any problems or questions, please contact the clinic at [phone] to leave a message. Our fax number is [phone]. If your issue cannot wait until the next business day, please go to urgent care or the emergency department.     I also strongly urge all of my patients to register for [employer] by going to: [email or web address]  (The  staff can also send you a text/email link to register when you check out).    No shows: It is understandable if you are unable to make it to a visit, but please cancel your appointment instead of not showing up. This helps to give other patients access to primary care and keeps wait times low.        [facility]   [phone]

## 2025-01-08 LAB
BACTERIA UR CULT: NORMAL
HOLD SPECIMEN: NORMAL

## 2025-01-21 ENCOUNTER — TELEMEDICINE (OUTPATIENT)
Dept: PRIMARY CARE | Facility: HOSPITAL | Age: 70
End: 2025-01-21
Payer: MEDICARE

## 2025-01-21 DIAGNOSIS — E11.9 TYPE 2 DIABETES MELLITUS WITHOUT COMPLICATION, WITHOUT LONG-TERM CURRENT USE OF INSULIN (MULTI): Primary | ICD-10-CM

## 2025-01-21 RX ORDER — METFORMIN HYDROCHLORIDE 500 MG/1
500 TABLET ORAL
Qty: 60 TABLET | Refills: 13 | Status: SHIPPED | OUTPATIENT
Start: 2025-01-21 | End: 2026-02-25

## 2025-01-21 NOTE — PROGRESS NOTES
Primary Care Internal Medicine Clinic Note     Subjective     Fabio Valles is a 69 y.o. 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    Interval history:  -last seen 2 weeks ago in C due to increased urinary frequency and incontinence. At this appointment, Chlorthalidone was discontinued and replaced by losartan, and Flomax 0.4 mg was started due to concern for BPH.   - UA collected with 4+ glucose, Hgb A1c 14.9 % from 5.7 % one month prior. No ketones on UA    Virtual visit today to discuss next steps.    - He was able to obtain endocrine appointment 02/03/2025 to establish care    ROS collected from patient and caregiver:    - drinking mainly water, sugar free juices    Polyuria: Yes  Polydipsia: Yes  Respirations: normal  Weight loss: no  Fatigue: no  Diarrhea: no  Vomiting: no  Abdominal pain: no  Cramping: no           Objective   Visit Vitals  Smoking Status Never       Physical exam:  VIRTUAL TELEPHONE VISIT UNABLE TO COMPLETE PHYSICAL EXAM  Medications:  Current Outpatient Medications   Medication Instructions    amLODIPine (NORVASC) 10 mg, oral, Daily    atorvastatin (LIPITOR) 20 mg, oral, Nightly    losartan (COZAAR) 50 mg, oral, Daily    risperiDONE (RISPERDAL) 0.5 mg, oral, Nightly    tamsulosin (FLOMAX) 0.4 mg, oral, Daily    trospium (SANCTURA) 20 mg, oral, Nightly        Allergies:  No Known Allergies          Assessment/Plan    69 y.o. male with PMH of dyslipidemia, HTN, intellectual disability (lives at a nursing home), and pedophilia who presents to the clinic for FUV after presenting with increased urinary frequency and found to have acute rise in A1c to 14.9% from 5.7% a few months prior.     This FU was to see efficacy of Flomax in treating his increased urinary frequency, however, it is now apparent that urinary frequency is due to severely uncontrolled diabetes that is not yet treated.    #Type 2  DM  - previously on metformin 500 mg BID, discontinued a few weeks ago. Had urinary urgency and incontinence in the past when A1c was < 6%. Represented 1/7/25 with increased urinary incontinence, A1c at this time 14.9%.   - Some suspicion for pancreatic etiology given acute rise and no obvious change in diet that would lead to such high A1c  - Endocrine appointment 02/03/2025  - Resume Metformin 500 mg BID  - check RFP  - Nutrition referral     Emailed plan to [email or web address] per caregiver request. Plan including new medications, labs and FU appointments reviewed with caregiver.    RTC in 3 months  Plan discussed with Dr. Trisha Kaiser MD  Internal Medicine Resident   [facility]  P [phone]  F [phone]

## 2025-01-21 NOTE — PATIENT INSTRUCTIONS
Thank you for attending your virtual appointment.     Your labs from your last appointment show that your blood sugar has been very high and that you have diabetes. This explains why you are urinating more frequently.    You have an appointment with Endocrinology on February 3 2025. Please come to this appointment to begin full treatment for your diabetes.       Schedule an appointment with your primary care doctor in 3 months.    Please call office if you have any questions or concerns: [phone]  [facility] Fax: [phone]

## 2025-01-28 NOTE — PROGRESS NOTES
I reviewed the resident/fellow's documentation and discussed the patient with the resident/fellow. I agree with the resident/fellow's medical decision making as documented in the note.    69 year old male with history of dyslipidemia, HTN, pre-diabetes, intellectual disability who lives in a nursing home coming today with complaint of urinary incontinence.  Need to consider if he has progressed to diabetes although his last hemoglobin A1C was 5.7%.  Also suspect some BPH.  Plan was to hold his metformin as he is on track with his sugars and will start flomax.  Also send hemoglobin A1C to make sure that this is not a sign of osmosis and higher sugars.  Prevention and screening addressed per resident note.      Ana Lilia Petty MD

## 2025-01-28 NOTE — PROGRESS NOTES
I reviewed the resident/fellow's documentation and discussed the patient with the resident/fellow. I agree with the resident/fellow's medical decision making as documented in the note.    Case d/w Dr. Rees; agree with his A/P. I have personally reviewed the plan with the pt as well. Would recheck A1C to make sure pt's A1C has not increased off of Metformin, causing glucoseuria/polyuria. Would stop Chlorthalidone and switch to Losartan and would possibly start Tamsulosin in case pt's symptoms are due to BPH.    Leslee Ferrell MD

## 2025-02-03 ENCOUNTER — OFFICE VISIT (OUTPATIENT)
Dept: ENDOCRINOLOGY | Facility: HOSPITAL | Age: 70
End: 2025-02-03
Payer: MEDICARE

## 2025-02-03 VITALS
DIASTOLIC BLOOD PRESSURE: 75 MMHG | TEMPERATURE: 97.5 F | HEART RATE: 94 BPM | BODY MASS INDEX: 27.46 KG/M2 | WEIGHT: 160 LBS | SYSTOLIC BLOOD PRESSURE: 135 MMHG

## 2025-02-03 DIAGNOSIS — R73.9 HYPERGLYCEMIA: ICD-10-CM

## 2025-02-03 DIAGNOSIS — E11.9 TYPE 2 DIABETES MELLITUS WITHOUT COMPLICATION, WITHOUT LONG-TERM CURRENT USE OF INSULIN (MULTI): Primary | ICD-10-CM

## 2025-02-03 LAB — GLUCOSE BLD MANUAL STRIP-MCNC: 383 MG/DL (ref 74–99)

## 2025-02-03 PROCEDURE — 3075F SYST BP GE 130 - 139MM HG: CPT | Performed by: STUDENT IN AN ORGANIZED HEALTH CARE EDUCATION/TRAINING PROGRAM

## 2025-02-03 PROCEDURE — 1123F ACP DISCUSS/DSCN MKR DOCD: CPT | Performed by: STUDENT IN AN ORGANIZED HEALTH CARE EDUCATION/TRAINING PROGRAM

## 2025-02-03 PROCEDURE — 99215 OFFICE O/P EST HI 40 MIN: CPT | Performed by: STUDENT IN AN ORGANIZED HEALTH CARE EDUCATION/TRAINING PROGRAM

## 2025-02-03 PROCEDURE — 82947 ASSAY GLUCOSE BLOOD QUANT: CPT | Performed by: STUDENT IN AN ORGANIZED HEALTH CARE EDUCATION/TRAINING PROGRAM

## 2025-02-03 PROCEDURE — 1159F MED LIST DOCD IN RCRD: CPT | Performed by: STUDENT IN AN ORGANIZED HEALTH CARE EDUCATION/TRAINING PROGRAM

## 2025-02-03 PROCEDURE — 3046F HEMOGLOBIN A1C LEVEL >9.0%: CPT | Performed by: STUDENT IN AN ORGANIZED HEALTH CARE EDUCATION/TRAINING PROGRAM

## 2025-02-03 PROCEDURE — 3078F DIAST BP <80 MM HG: CPT | Performed by: STUDENT IN AN ORGANIZED HEALTH CARE EDUCATION/TRAINING PROGRAM

## 2025-02-03 PROCEDURE — 4010F ACE/ARB THERAPY RXD/TAKEN: CPT | Performed by: STUDENT IN AN ORGANIZED HEALTH CARE EDUCATION/TRAINING PROGRAM

## 2025-02-03 RX ORDER — BLOOD-GLUCOSE CONTROL, NORMAL
EACH MISCELLANEOUS
Qty: 200 EACH | Refills: 11 | Status: SHIPPED | OUTPATIENT
Start: 2025-02-03

## 2025-02-03 RX ORDER — INSULIN GLARGINE 100 [IU]/ML
INJECTION, SOLUTION SUBCUTANEOUS
Qty: 20 ML | Refills: 3 | Status: SHIPPED | OUTPATIENT
Start: 2025-02-03

## 2025-02-03 RX ORDER — INSULIN PUMP SYRINGE, 3 ML
EACH MISCELLANEOUS
Qty: 1 KIT | Refills: 1 | Status: SHIPPED | OUTPATIENT
Start: 2025-02-03

## 2025-02-03 RX ORDER — BLOOD-GLUCOSE,RECEIVER,CONT
EACH MISCELLANEOUS
Qty: 1 EACH | Refills: 0 | Status: SHIPPED | OUTPATIENT
Start: 2025-02-03

## 2025-02-03 RX ORDER — BLOOD-GLUCOSE METER
EACH MISCELLANEOUS
Qty: 200 STRIP | Refills: 3 | Status: SHIPPED | OUTPATIENT
Start: 2025-02-03

## 2025-02-03 RX ORDER — BLOOD-GLUCOSE SENSOR
EACH MISCELLANEOUS
Qty: 3 EACH | Refills: 11 | Status: SHIPPED | OUTPATIENT
Start: 2025-02-03

## 2025-02-03 ASSESSMENT — ENCOUNTER SYMPTOMS
LOSS OF SENSATION IN FEET: 0
OCCASIONAL FEELINGS OF UNSTEADINESS: 0
DEPRESSION: 0

## 2025-02-03 ASSESSMENT — PATIENT HEALTH QUESTIONNAIRE - PHQ9
SUM OF ALL RESPONSES TO PHQ9 QUESTIONS 1 AND 2: 0
1. LITTLE INTEREST OR PLEASURE IN DOING THINGS: NOT AT ALL
2. FEELING DOWN, DEPRESSED OR HOPELESS: NOT AT ALL

## 2025-02-03 ASSESSMENT — COLUMBIA-SUICIDE SEVERITY RATING SCALE - C-SSRS
6. HAVE YOU EVER DONE ANYTHING, STARTED TO DO ANYTHING, OR PREPARED TO DO ANYTHING TO END YOUR LIFE?: NO
1. IN THE PAST MONTH, HAVE YOU WISHED YOU WERE DEAD OR WISHED YOU COULD GO TO SLEEP AND NOT WAKE UP?: NO
2. HAVE YOU ACTUALLY HAD ANY THOUGHTS OF KILLING YOURSELF?: NO

## 2025-02-03 NOTE — PROGRESS NOTES
Chief complaint: T2DM    HPI:  Fabio Valles is a 69 y.o. male with PMH of DLD, HTN, intellectual disability (lives at group home) presenting to [facility] care with endocrinology clinic. History from patient and supervisors from group home.    Patient was seen by [facility] primary care on 09/18/2024. His primary complaint at this visit was urinary frequency/incontinence. He was treated with trospium and flomax and his metformin was discontinued since his sugars appeared to be at goal. He was seen again on 01/07/2025  with continued urinary incontinence and frequency. Labs ordered at this visit showed HbA1c of 14.9% increased from 5.7% in 03/2024. Patient had virtual follow-up visit on 1/21/25 with [facility] primary care clinic. He was noted to have polydipsia and polyuria and was drinking water, sugar free juices, denied other sxs at the time. His metformin 500 mg BID was restarted at this visit and he was referred to nutrition.     Per patient/caregiver, patient was on metformin and then sugars got better so his metformin was discontinued. He started eating more sugar but has cut back since finding out his sugars were poorly controlled. His group home supervisors say that there is not great control over what patient eats at the group home. He reports that he is still drinking a lot of water and sugar free juice. Patient is still having urinary incontinence (had an accident last week) but going less frequently than he was at beginning of January. Patient is unaware of any family history of diabetes. When asked for diet recall, patient reports eating cereal (frosted flakes) for breakfast. He did not eat lunch yesterday and had meatloaf, macaroni and cheese, dressing, and cornbread for dinner. He is walking for exercise. Patient has not yet seen nutrition. They also have not yet received diabetes supplies so they have not been checking sugars at home. Supervisor reports that patient follows with ophthalmology and is to have a  follow-up appointment in 1 month.    Review of systems is positive for polydipsia and polyuria and negative for fevers/chills, fatigue, weight changes, shortness of breath, nausea, vomiting, abdominal pain, numbness/tingling, and leg swelling.    Medications:  Current Outpatient Medications   Medication Instructions    amLODIPine (NORVASC) 10 mg, oral, Daily    atorvastatin (LIPITOR) 20 mg, oral, Nightly    losartan (COZAAR) 50 mg, oral, Daily    metFORMIN (GLUCOPHAGE) 500 mg, oral, 2 times daily (morning and late afternoon)    risperiDONE (RISPERDAL) 0.5 mg, oral, Nightly    tamsulosin (FLOMAX) 0.4 mg, oral, Daily    trospium (SANCTURA) 20 mg, oral, Nightly       Allergies:  No Known Allergies    Past medical history:  No past medical history on file.    Surgical history:  No past surgical history on file.    Family history:  No family history on file.    Social history:   reports that he has never smoked. He has never used smokeless tobacco. He reports that he does not drink alcohol and does not use drugs.    Vitals:  Vitals:    02/03/25 0918   BP: 135/75   Pulse: 94   Temp: 36.4 °C (97.5 °F)     Physical exam:  General: well-appearing, no acute distress, resting comfortably in bed  HEENT: normocephalic, atraumatic  Cardio: regular rate and rhythm, normal S1 and S2, no murmurs  Pulm: clear to auscultation bilaterally, no wheezes, crackles, or rhonchi, breathing comfortably on room air  Abd: normal, active bowel sounds; soft, non-tender, non-distended  Extremities: no peripheral edema, scars, or lesions  Neuro: alert and oriented to person, place, and time, CN II-XII grossly intact  Psych: mood and affect are appropriate    Labs:  Lab Results   Component Value Date    WBC 6.3 03/29/2024    HGB 13.5 03/29/2024    HCT 38.4 (L) 03/29/2024    MCV 89 03/29/2024     03/29/2024       Lab Results   Component Value Date    GLUCOSE 169 (H) 03/29/2024    CALCIUM 9.3 03/29/2024     03/29/2024    K 3.3 (L)  "03/29/2024    CO2 27 03/29/2024     03/29/2024    BUN 12 03/29/2024    CREATININE 0.76 03/29/2024       Lab Results   Component Value Date    HGBA1C 14.9 (H) 01/07/2025        Lab Results   Component Value Date    CHOL 113 03/09/2023    CHOL 96 08/03/2021    CHOL 117 11/06/2020     Lab Results   Component Value Date    HDL 30.3 (A) 03/09/2023    HDL 36.3 (A) 08/03/2021    HDL 34.0 (A) 11/06/2020     No results found for: \"LDLCALC\"  Lab Results   Component Value Date    TRIG 120 03/09/2023    TRIG 51 08/03/2021    TRIG 145 11/06/2020     No components found for: \"CHOLHDL\"    Other notable labs:  A1c 14.9  UA 4+ glucose, trace blood, 500 leuk esterase  Microscopic: budding yeast, 1+ bacteria, 11-20 WBC, > 20 RBC  POC glucose in clinic is 383    Assessment and plan:  Fabio Valles is a 69 y.o. male with PMH of DLD, HTN, intellectual disability (lives at nursing home) presenting to establish care with endocrinology clinic for newly diagnosed T2DM (last A1c 14.9 1/2025, on metformin).    #T2DM  Poorly controlled, newly diagnosed T2DM. A1c in 1/2025 is 14.9, previously 5.7 last March. Patient was on metformin prior to most recent A1c. Sugar in office is 383. Patient having polydipsia and polyuria though improved from early January. Otherwise asymptomatic.   Plan   - continue metformin 500 mg BID; if Cr wnl on RFP, then increase metformin to 1000 mg BID  - start insulin glargine 15 units, if sugars < 90 in the AM or at night, drop insulin by 2 units every 2-3 days, supervisor instructed to call endo clinic if sugars are > 250  - start januvia 100 mg daily  - ordered CGM w reader and glucometer/strips  - ordered RFP, lipid panel, urine albumin-Cr ratio  - caregivers to schedule appointment with nutrition  - continue follow-up with ophthalmology    Follow-up in 4 weeks.    Patient and plan discussed with attending physician Dr. Saavedra.    Nikki Weiner MD  Internal Medicine PGY1  "

## 2025-02-03 NOTE — PATIENT INSTRUCTIONS
As discussed today, our plan is:     Labs - we ordered labs today - please stop by the lab to get them collected. If you have any questions or concerns prior to us calling feel free to call our office to have your questions addressed.   Medication changes:   - start insulin glargine 15 units daily (can be in the morning, just ensure same time each day). If blood sugar measurement shows < 90 in the morning, decrease insulin glargine by 2 units every 2-3 days.  - start januvia 100 mg daily  - continue metformin 500 mg twice a day  We have ordered a continuous glucose monitor and also a glucometer with glucose strips to measure your sugar at home. Please pick these up from the pharmacy.    Please come back to see us in: 4-6 weeks   ------  If you have any problems or questions, please contact the clinic at [phone] to leave a message. Our fax number is [phone]. If your issue cannot wait until the next business day, please go to urgent care or the emergency department.     I also strongly urge all of my patients to register for "[facility]"hart by going to: [email or web address]  (The  staff can also send you a text/email link to register when you check out).    No shows: It is understandable if you are unable to make it to a visit, but please cancel your appointment instead of not showing up. This helps to give other patients access to primary care and keeps wait times low.      [facility]   [phone]

## 2025-02-04 LAB
ALBUMIN SERPL-MCNC: 4.3 G/DL (ref 3.6–5.1)
ALBUMIN/CREAT UR: 6 MG/G CREAT
BUN SERPL-MCNC: 11 MG/DL (ref 7–25)
BUN/CREAT SERPL: 16 (CALC) (ref 6–22)
CALCIUM SERPL-MCNC: 9.1 MG/DL (ref 8.6–10.3)
CHLORIDE SERPL-SCNC: 102 MMOL/L (ref 98–110)
CHOLEST SERPL-MCNC: 115 MG/DL
CHOLEST/HDLC SERPL: 3.3 (CALC)
CO2 SERPL-SCNC: 24 MMOL/L (ref 20–32)
CREAT SERPL-MCNC: 0.69 MG/DL (ref 0.7–1.28)
CREAT UR-MCNC: 36 MG/DL (ref 20–320)
EGFRCR SERPLBLD CKD-EPI 2021: 100 ML/MIN/1.73M2
GLUCOSE SERPL-MCNC: 385 MG/DL (ref 65–99)
HDLC SERPL-MCNC: 35 MG/DL
LDLC SERPL CALC-MCNC: 61 MG/DL (CALC)
MICROALBUMIN UR-MCNC: 0.2 MG/DL
NONHDLC SERPL-MCNC: 80 MG/DL (CALC)
PHOSPHATE SERPL-MCNC: 3.2 MG/DL (ref 2.1–4.3)
POTASSIUM SERPL-SCNC: 3.7 MMOL/L (ref 3.5–5.3)
SODIUM SERPL-SCNC: 136 MMOL/L (ref 135–146)
TRIGL SERPL-MCNC: 100 MG/DL

## 2025-02-11 NOTE — PROGRESS NOTES
I reviewed the resident/fellow's documentation and discussed the patient with the resident/fellow. I agree with the resident/fellow's medical decision making as documented in the note.    Second attestation.    Ana Lilia Petty MD

## 2025-02-19 NOTE — PROGRESS NOTES
I reviewed the resident/fellow's documentation and discussed the patient with the resident/fellow. I agree with the resident/fellow's medical decision making as documented in the note.     Courtney Hutchinson MD MPH

## 2025-02-21 DIAGNOSIS — Z00.00 HEALTHCARE MAINTENANCE: ICD-10-CM

## 2025-02-21 DIAGNOSIS — I10 HYPERTENSION, UNSPECIFIED TYPE: ICD-10-CM

## 2025-02-24 RX ORDER — AMLODIPINE BESYLATE 10 MG/1
10 TABLET ORAL DAILY
Qty: 20 TABLET | Refills: 10 | Status: SHIPPED | OUTPATIENT
Start: 2025-02-24

## 2025-02-24 RX ORDER — ATORVASTATIN CALCIUM 20 MG/1
20 TABLET, FILM COATED ORAL NIGHTLY
Qty: 20 TABLET | Refills: 10 | Status: SHIPPED | OUTPATIENT
Start: 2025-02-24

## 2025-03-09 NOTE — PROGRESS NOTES
[facility]  3/10/2025  HISTORY OF PRESENT ILLNESS   Fabio Valles is a 69 y.o. male with PMH of DLD, HTN, intellectual disability (lives at group home) presenting to endocrinology for follow up of DM2.  History from patient and supervisor from jail.    Last seen in endocrinology in 02/2025, at which time he established care with endocrinology.   Today:  -Patient reports feeling well. Denies any increased thirst, urination, or blurry vision.   -POCT glucose 86 this AM    Current regimen:   Metformin 500mg BID  Lantus 15units in the morning  Januvia 100mg daily    Prior medications stopped due to side effects: none    Most recent HgbA1c: HbA1c of 14.9% (01/07/2025), increased from 5.7% in 03/2024.  SMBG: Dexcom G7  Forgot to bring sensor to today's appointment  Per supervisor report: High in the AM in 250s on Dexcom, finger will be 180s/190s. In afternoon at 4, will be in 50s and boost by 110. By end of night sugar is good. Dexcom typically reading 30 points over.      Hypoglycemia: yes and happening frequently in the afternoons with sugars in 50s, patient will appear flushed  Hypoglycemia awareness: no, patient will always say he feels fine  Weight changes:   Vitals:    03/10/25 0841   Weight: 73.5 kg (162 lb)      01/2025:  Wt 79.4 kg (175 lb)     Diet:  - Eating 3 meals a day, Lives at group home so patient does not have control over diet  Breakfast  Rice (sometimes sugary cereals), sometimes coffee now with sweet and low  Lunch: sandwich  Dinner: meat, two sides, veggie  Drink: water, diet juice  Snacks: granola bar  Previously had sugary snacks in his room, supervisors removed these     DM history:   Patient was seen by [facility] primary care on 09/18/2024. His primary complaint at this visit was urinary frequency/incontinence. He was treated with trospium and flomax and his metformin was discontinued since his sugars appeared to be at goal. He was seen again on 01/07/2025  with  continued urinary incontinence and frequency. Labs ordered at this visit showed  Patient had virtual follow-up visit on 1/21/25 with [facility] primary care clinic. He was noted to have polydipsia and polyuria and was drinking water, sugar free juices, denied other sxs at the time. His metformin 500 mg BID was restarted at this visit and he was referred to nutrition. HbA1c of 14.9% (01/07/2025) increased from 5.7% in 03/2024. Started on lantus 15 and januvia 100 (with metformin continued at same dose).    Last eye exam: February 2025, just got new glasses, goes to [facility] (supervisor denies retinopathy)  Last foot exam: every 9 weeks    Review of Systems   Negative for fevers/chills, fatigue, weight changes, shortness of breath, nausea, vomiting, abdominal pain, numbness/tingling, and leg swelling     MEDICATIONS       Current Outpatient Medications:     amLODIPine (Norvasc) 10 mg tablet, TAKE 1 TAB BY MOUTH ONCE DAILY, Disp: 20 tablet, Rfl: 10    atorvastatin (Lipitor) 20 mg tablet, TAKE 1 TAB BY MOUTH EVERY DAY AT BEDTIME, Disp: 20 tablet, Rfl: 10    Blood glucose monitoring (OneTouch Verio Flex Start) meter, Check BG 2-3 times a day Substitute with any glucometer/strips/lancets covered by insurance, Disp: 1 kit, Rfl: 1    blood sugar diagnostic (OneTouch Verio test strips) strip, Check BG 2-3 times a day Substitute with any glucometer/strips/lancets covered by insurance, Disp: 200 strip, Rfl: 3    blood-glucose sensor (Dexcom G7 Sensor) device, Check BG 3-4 times, Disp: 3 each, Rfl: 11    Dexcom G7  misc, Use as instructed, Disp: 1 each, Rfl: 0    insulin glargine (Lantus) 100 unit/mL (3 mL) pen, Take as directed per insulin instructions. Take 15 units daily, Disp: 20 mL, Rfl: 3    lancets (OneTouch Delica Plus Lancet) 30 gauge misc, Check BG 2-3 times a day Substitute with any glucometer/strips/lancets covered by insurance, Disp: 200 each, Rfl: 11    losartan (Cozaar) 25 mg tablet, Take 2 tablets  "(50 mg) by mouth once daily., Disp: 60 tablet, Rfl: 11    metFORMIN (Glucophage) 500 mg tablet, Take 1 tablet (500 mg) by mouth 2 times daily (morning and late afternoon)., Disp: 60 tablet, Rfl: 13    pen needle 1/2\" 29G X 12mm needle, Use to inject 1-4 times daily as directed, Disp: 100 each, Rfl: 3    risperiDONE (RisperDAL) 0.5 mg tablet, Take 1 tablet (0.5 mg) by mouth once daily at bedtime., Disp: 30 tablet, Rfl: 11    SITagliptin phosphate (Januvia) 100 mg tablet, Take 1 tablet (100 mg) by mouth once daily., Disp: 90 tablet, Rfl: 3    tamsulosin (Flomax) 0.4 mg 24 hr capsule, Take 1 capsule (0.4 mg) by mouth once daily., Disp: 30 capsule, Rfl: 11    trospium (Sanctura) 20 mg tablet, TAKE 1 TAB BY MOUTH EVERY DAY AT BEDTIME, Disp: 20 tablet, Rfl: 10     HISTORY     PMH: per above    No Known Allergies     Social history:   - Denies alcohol use   - Denies tobacco use   - Denies recreational drug use   - Lives with: group home  - Enjoys going to [employer] and going out    PHYSICAL EXAM     Visit Vitals  /67   Pulse 88   Temp 35.7 °C (96.2 °F) (Temporal)   Ht 1.626 m (5' 4\")   Wt 73.5 kg (162 lb)   BMI 27.81 kg/m²   Smoking Status Never   BSA 1.82 m²      General: Well appearing, no acute distress  Heart: Regular rate and rhythm, no murmurs  Lungs: Breathing comfortably on room air   Skin: No rashes    LABS        Lab Results   Component Value Date    TSH 1.25 03/09/2023      Lab Results   Component Value Date    HGBA1C 14.9 (H) 01/07/2025    HGBA1C 5.7 (H) 03/29/2024    HGBA1C 5.5 07/05/2023     Lab Results   Component Value Date    LDLCALC 61 02/03/2025    CREATININE 0.69 (L) 02/03/2025     Lab Results   Component Value Date    GLUCOSE 385 (H) 02/03/2025    CALCIUM 9.1 02/03/2025     02/03/2025    K 3.7 02/03/2025    CO2 24 02/03/2025     02/03/2025    BUN 11 02/03/2025    CREATININE 0.69 (L) 02/03/2025       ASSESSMENT AND PLAN   Fabio Valles is a 69 y.o. male with PMH of DLD, HTN, " intellectual disability (lives at group home) presenting to endocrinology for follow up of DM2.     #T2DM  A1c in 1/2025 was 14.9, previously 5.7 last March 2024. Patient had previously been on metformin prior to A1c of 5.7 when it was stopped, and patient developed symptomatic hyperglycemia. Metformin resumed and patient was started on lantus 15 and Januvia in 01/2025. Sugars under better control and hyperglycemia symptoms resolved, however group home supervisors are reporting patient is having afternoon lows of 50s. No dexcom data today. Will decrease insulin to prevent hypoglycemia. HgbA1c goal 7.5 given age and no significant medical comorbidities. Suspect that patient will be able to be titrated off of insulin eventually. Of note, patient has lost weight since last appointment (13 lbs).     - Lipids: LDL 61 (02/2025) on atorvastatin 20  - BP: 128/67, well controlled  - Urine Alb/Cr: wnl (02/2025), on losartan 50mg  - Recent A1c: 14.9 (01/2025)    Plan   -Decrease insulin to 8 units daily. If having lows below 80s in the evenings or overnight, drop lantus by 2 units every 3 days and maintain that lower dose  -Transition the insulin to evening dosing starting tomorrow (3/11), instead of in the morning  -Increase metformin to 1000mg twice a day (start with 1000 in the morning and 500 in the evening for 1 week, and if well tolerated (no side effects) then go ahead and increase to 1000mg in the morning and 1000mg in the evening)  -Continue januvia 100 mg daily  -Reminded to bring CGM sensor to next appointment  -Reminded patient and supervisor that he should keep CGM sensor in his pocket at all times (must be within 6 feet of him to get continuous readings)  -Advised that scheduled finger sticks are not needed, can rely on the Dexcom as long as blood glucoses are good. If the Dexcom is reading low (<80) or high (>250) then recommended to do a finger stick to confirm.   - caregivers to schedule appointment with  nutrition  - continue follow-up with ophthalmology and podiatry     Follow-up in 6 weeks.    Laura Hobbs, PGY3  Endocrinology

## 2025-03-10 ENCOUNTER — OFFICE VISIT (OUTPATIENT)
Dept: ENDOCRINOLOGY | Facility: HOSPITAL | Age: 70
End: 2025-03-10
Payer: MEDICARE

## 2025-03-10 VITALS
SYSTOLIC BLOOD PRESSURE: 128 MMHG | DIASTOLIC BLOOD PRESSURE: 67 MMHG | TEMPERATURE: 96.2 F | HEART RATE: 88 BPM | WEIGHT: 162 LBS | HEIGHT: 64 IN | BODY MASS INDEX: 27.66 KG/M2

## 2025-03-10 DIAGNOSIS — E11.9 TYPE 2 DIABETES MELLITUS WITHOUT COMPLICATION, WITHOUT LONG-TERM CURRENT USE OF INSULIN (MULTI): ICD-10-CM

## 2025-03-10 LAB — GLUCOSE BLD MANUAL STRIP-MCNC: 86 MG/DL (ref 74–99)

## 2025-03-10 PROCEDURE — 82947 ASSAY GLUCOSE BLOOD QUANT: CPT | Performed by: STUDENT IN AN ORGANIZED HEALTH CARE EDUCATION/TRAINING PROGRAM

## 2025-03-10 PROCEDURE — 1126F AMNT PAIN NOTED NONE PRSNT: CPT | Performed by: STUDENT IN AN ORGANIZED HEALTH CARE EDUCATION/TRAINING PROGRAM

## 2025-03-10 PROCEDURE — 3008F BODY MASS INDEX DOCD: CPT | Performed by: STUDENT IN AN ORGANIZED HEALTH CARE EDUCATION/TRAINING PROGRAM

## 2025-03-10 PROCEDURE — 4010F ACE/ARB THERAPY RXD/TAKEN: CPT | Performed by: STUDENT IN AN ORGANIZED HEALTH CARE EDUCATION/TRAINING PROGRAM

## 2025-03-10 PROCEDURE — 99215 OFFICE O/P EST HI 40 MIN: CPT | Mod: GC | Performed by: STUDENT IN AN ORGANIZED HEALTH CARE EDUCATION/TRAINING PROGRAM

## 2025-03-10 PROCEDURE — G2211 COMPLEX E/M VISIT ADD ON: HCPCS | Performed by: STUDENT IN AN ORGANIZED HEALTH CARE EDUCATION/TRAINING PROGRAM

## 2025-03-10 PROCEDURE — 99215 OFFICE O/P EST HI 40 MIN: CPT | Performed by: STUDENT IN AN ORGANIZED HEALTH CARE EDUCATION/TRAINING PROGRAM

## 2025-03-10 PROCEDURE — 3074F SYST BP LT 130 MM HG: CPT | Performed by: STUDENT IN AN ORGANIZED HEALTH CARE EDUCATION/TRAINING PROGRAM

## 2025-03-10 PROCEDURE — 1123F ACP DISCUSS/DSCN MKR DOCD: CPT | Performed by: STUDENT IN AN ORGANIZED HEALTH CARE EDUCATION/TRAINING PROGRAM

## 2025-03-10 PROCEDURE — 1159F MED LIST DOCD IN RCRD: CPT | Performed by: STUDENT IN AN ORGANIZED HEALTH CARE EDUCATION/TRAINING PROGRAM

## 2025-03-10 PROCEDURE — 3078F DIAST BP <80 MM HG: CPT | Performed by: STUDENT IN AN ORGANIZED HEALTH CARE EDUCATION/TRAINING PROGRAM

## 2025-03-10 PROCEDURE — 3046F HEMOGLOBIN A1C LEVEL >9.0%: CPT | Performed by: STUDENT IN AN ORGANIZED HEALTH CARE EDUCATION/TRAINING PROGRAM

## 2025-03-10 RX ORDER — INSULIN GLARGINE 100 [IU]/ML
INJECTION, SOLUTION SUBCUTANEOUS
Qty: 20 ML | Refills: 3 | Status: SHIPPED | OUTPATIENT
Start: 2025-03-10

## 2025-03-10 RX ORDER — METFORMIN HYDROCHLORIDE 500 MG/1
1000 TABLET ORAL
Qty: 120 TABLET | Refills: 13 | Status: SHIPPED | OUTPATIENT
Start: 2025-03-10 | End: 2026-04-14

## 2025-03-10 ASSESSMENT — ENCOUNTER SYMPTOMS
DEPRESSION: 0
OCCASIONAL FEELINGS OF UNSTEADINESS: 0
LOSS OF SENSATION IN FEET: 0

## 2025-03-10 ASSESSMENT — PAIN SCALES - GENERAL: PAINLEVEL_OUTOF10: 0-NO PAIN

## 2025-03-10 ASSESSMENT — PATIENT HEALTH QUESTIONNAIRE - PHQ9
1. LITTLE INTEREST OR PLEASURE IN DOING THINGS: NOT AT ALL
SUM OF ALL RESPONSES TO PHQ9 QUESTIONS 1 AND 2: 0
2. FEELING DOWN, DEPRESSED OR HOPELESS: NOT AT ALL

## 2025-03-10 NOTE — PATIENT INSTRUCTIONS
It was a pleasure to see you! You are having lows so we would like to decrease your insulin.     -Decrease insulin to 8 units daily. If having lows below 80s in the evenings or overnight drop lantus by 2 units every 3 days and maintain that lower dose  -Please switch the insulin dosing to evening time starting tomorrow (3/11), instead of in the morning  -Increase metformin to 1000mg twice a day (start with 1000 in the morning and 500 in the evening for 1 week, and if well tolerated (no side effects) then go ahead and increase to 1000 in the morning and 1000 in the evening)  -Keep the sensor in his pocket at all times (must be within 6 feet of him to get continuous readings)  -Don't need to do scheduled finger sticks, you can rely on the Dexcom. If the Dexcom is reading low (<80) or high (>250) then you should do a finger stick to confirm.     Please return in 6 weeks and please bring the Dexcom sensor so that we can look at your readings over time.    Reach out by Abner if you have any questions.   [facility]

## 2025-03-19 ENCOUNTER — OFFICE VISIT (OUTPATIENT)
Dept: PRIMARY CARE | Facility: HOSPITAL | Age: 70
End: 2025-03-19
Payer: MEDICARE

## 2025-03-19 VITALS
HEIGHT: 64 IN | OXYGEN SATURATION: 99 % | DIASTOLIC BLOOD PRESSURE: 75 MMHG | TEMPERATURE: 96.2 F | BODY MASS INDEX: 28.17 KG/M2 | WEIGHT: 165 LBS | HEART RATE: 87 BPM | SYSTOLIC BLOOD PRESSURE: 127 MMHG

## 2025-03-19 DIAGNOSIS — E11.9 TYPE 2 DIABETES MELLITUS WITHOUT COMPLICATION, WITHOUT LONG-TERM CURRENT USE OF INSULIN (MULTI): Primary | ICD-10-CM

## 2025-03-19 DIAGNOSIS — Z00.00 HEALTHCARE MAINTENANCE: ICD-10-CM

## 2025-03-19 LAB — GLUCOSE BLD MANUAL STRIP-MCNC: 80 MG/DL (ref 74–99)

## 2025-03-19 PROCEDURE — 99214 OFFICE O/P EST MOD 30 MIN: CPT | Mod: GC

## 2025-03-19 PROCEDURE — 82947 ASSAY GLUCOSE BLOOD QUANT: CPT

## 2025-03-19 PROCEDURE — 36416 COLLJ CAPILLARY BLOOD SPEC: CPT | Mod: GC

## 2025-03-19 ASSESSMENT — ENCOUNTER SYMPTOMS
DEPRESSION: 0
LOSS OF SENSATION IN FEET: 0
OCCASIONAL FEELINGS OF UNSTEADINESS: 0

## 2025-03-19 ASSESSMENT — PAIN SCALES - GENERAL: PAINLEVEL_OUTOF10: 0-NO PAIN

## 2025-03-19 NOTE — PATIENT INSTRUCTIONS
08/07/24 1058   Critical Congenital Heart Defect Screen   Critical Congenital Heart Defect Screen Date 08/07/24   Critical Congenital Heart Defect Screen Time 1059   Age at Screening 24 Hours   SpO2: Pre-Ductal (Right Hand) 100 %   SpO2: Post-Ductal (Either Foot)  100 %   Critical Congenital Heart Defect Score Negative (passed)        Dear Fabio Valles     Here is what we discussed  Continue your diabetes medications  We will check your A1c today (for your diabetes)  Please make sure to follow up with your endocrinologist, psychiatrist, and ophthalmologist   Please get your colonoscopy done, we have placed a referral    Please see us back in 3 months.   [facility]   [phone]

## 2025-03-19 NOTE — PROGRESS NOTES
HISTORY OF PRESENT ILLNESS:     Fabio Valles is a 69 y.o. male with hx of dyslipidemia, HTN, DM, intellectual disability (lives at a group home),  pedophilia, and intermittent explosive disorder (follows w psych) who presents to the clinic for FUV.     Pt was last seen virtually on 1/21/25.     Interim hx:   -Pt saw endocrine twice since then, latest appt on 3/10/25. Current plan is lantus 8 nightly, metformin 1000mg/500mg and if tolerating to increase to 1000mg bid, cont januvia 100mg daily     Today:   Pt states no concerns, he states that urinary frequency has improved. He has a caregiver here from the group home who states that they don't check his sugars regularly, but that his cgm will beep and notify them when sugars are too low or too high. In the past week, his cgm rang once over the weekend with a high reading (caregiver is unsure what it was) after he had eaten a large meal. Otherwise no concerns. She also does not know of any recent incontinence episodes.     Reviewed med list from the group home, he is now on 1000mg bid of metformin which was started in past 1-2 days.       ROS: 12 point ROS negative unless as per HPI     There are no active problems to display for this patient.       Current Outpatient Medications:     amLODIPine (Norvasc) 10 mg tablet, TAKE 1 TAB BY MOUTH ONCE DAILY, Disp: 20 tablet, Rfl: 10    atorvastatin (Lipitor) 20 mg tablet, TAKE 1 TAB BY MOUTH EVERY DAY AT BEDTIME, Disp: 20 tablet, Rfl: 10    Blood glucose monitoring (OneTouch Verio Flex Start) meter, Check BG 2-3 times a day Substitute with any glucometer/strips/lancets covered by insurance, Disp: 1 kit, Rfl: 1    blood sugar diagnostic (OneTouch Verio test strips) strip, Check BG 2-3 times a day Substitute with any glucometer/strips/lancets covered by insurance, Disp: 200 strip, Rfl: 3    blood-glucose sensor (Dexcom G7 Sensor) device, Check BG 3-4 times, Disp: 3 each, Rfl: 11    Dexcom G7  misc, Use as  "instructed, Disp: 1 each, Rfl: 0    insulin glargine (Lantus) 100 unit/mL (3 mL) pen, Take as directed per insulin instructions. Take 8 units daily in the evening (starting 3/11), Disp: 20 mL, Rfl: 3    lancets (OneTouch Delica Plus Lancet) 30 gauge misc, Check BG 2-3 times a day Substitute with any glucometer/strips/lancets covered by insurance, Disp: 200 each, Rfl: 11    losartan (Cozaar) 25 mg tablet, Take 2 tablets (50 mg) by mouth once daily., Disp: 60 tablet, Rfl: 11    metFORMIN (Glucophage) 500 mg tablet, Take 2 tablets (1,000 mg) by mouth 2 times daily (morning and late afternoon). Start the first week with taking 1,000mg in the morning and 500mg in the late afternoon, then after the 1st week if well tolerated- increase to 1,000mg in the morning and 1,000mg in the late afternoon, Disp: 120 tablet, Rfl: 13    pen needle 1/2\" 29G X 12mm needle, Use to inject 1-4 times daily as directed, Disp: 100 each, Rfl: 3    risperiDONE (RisperDAL) 0.5 mg tablet, Take 1 tablet (0.5 mg) by mouth once daily at bedtime., Disp: 30 tablet, Rfl: 11    SITagliptin phosphate (Januvia) 100 mg tablet, Take 1 tablet (100 mg) by mouth once daily., Disp: 90 tablet, Rfl: 3    tamsulosin (Flomax) 0.4 mg 24 hr capsule, Take 1 capsule (0.4 mg) by mouth once daily., Disp: 30 capsule, Rfl: 11    trospium (Sanctura) 20 mg tablet, TAKE 1 TAB BY MOUTH EVERY DAY AT BEDTIME, Disp: 20 tablet, Rfl: 10    No results found for this or any previous visit (from the past 96 hours).       PHYSICAL EXAM:   General: pt is pleasant, cooperative, in NAD  Heart: RRR, no murmur, rub or [place]  Lungs: clear to auscultation bilaterally with good airflow throughout. No wheezes or rhonchi.  Abdomen: soft, nontender, nondistended, no apparent mass  Extremities: no edema bilaterally  Skin: no rash or lesions; warm and dry   Psychiatric: mental status and behavior appropriate for situation, known intellectual disability    Neurologic: Normal gait and stance. Normal " speech character and tone. No apparent focal deficits.       Musculoskeletal: moving all extremities appropriately.    Assessment and plan:   Fabio Valles is a 69 y.o. male with hx of dyslipidemia, HTN, DM, intellectual disability (lives at a group home),  pedophilia, and intermittent explosive disorder (follows w psych) who presents to the clinic for FUV.     Updates:   -due for Colonoscopy, spoke w patient and he is amenable   -continue to Follow up w psych, endo, ophtho   -pt recently saw eye doctor per caregiver, got his eye glasses a couple of weeks ago   -A1c recheck today   -Physician's Progress/Observation Notes form filled out for group home (brought in by caregiver)       #Urinary Urgency - resolved  -Pt states no current issues w frequency, caregiver states no issues with incontinence    PLAN:  - Continue Trospium at night time  -  continue Tamulosin 0.4mg nightly     #HTN  -bp controlled today at 127/75   PLAN:  -c/w amlodipine 10mg and losartan 50mg   -note: took chlorthalidone 25mg daily in the PAST, was discontinued at prior visit       #Dyslipidemia  - Lipids panel: T chol 115, HDL 35, LDL 61,  (2/2025)  - Cont atorvastatin 20 mg      #DM   -follows w endocrine  -A1c 14.9% (1/7/2025)   PLAN:  - Recheck A1C today  -continue current regimen of lantus 8 at bedtime, januvia 100 daily, metformin 1000mg bid      #Hearing loss - did not discuss today   -Follows w ENT, last seen in April 2023: 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 Patient is on anti-HTN and statin medications per guidelines. Advised  patient to emphasize whole food diet and limit processed food intake.   -Diabetes: A1C 5.7 (3/2024)  -Cancer:   Colorectal: Due for Cscope in 2025  Lung: N/A  #Behavioral Counseling:   Tobacco/smoking: Never smoker  Alcohol: Never drinker  Diet/exercise: No exercise, diet balanced  #Immunizations:  - Pneumococcal: [id number] dose completed 3/2020  - Flu: Will get at home pharmacy along with Shingrix and RSV  - COVID: Primary series completed  - RSV: Will consider at pharmacy today  - Tdap: Up to date, 2021  - Zoster: Encouraged to get at pharmacy  #Infectious   -HIV nonreactive   -Hepatitis B/C panel negative     RTC IN 3 mos      Blanquita Ricci MD   IM PGY-3

## 2025-03-20 LAB
EST. AVERAGE GLUCOSE BLD GHB EST-MCNC: 123 MG/DL
EST. AVERAGE GLUCOSE BLD GHB EST-SCNC: 6.8 MMOL/L
HBA1C MFR BLD: 5.9 % OF TOTAL HGB

## 2025-03-23 NOTE — PROGRESS NOTES
"Mr. Valles is a 68 yo man here for follow up.  He is also seen by endocrinology and psychiatry.    Chronic medical problems include:  HTN  Hypercholesterolemia  DM  Intellectual disability (lives in group home)  Intermittent explosive disorder (anger management issues    When he was last seen, he was complaining of urinary frequency and urge incontinence. It seems to be somewhat better. His A1c has increased dramatically from 5% to 14%. At his last visit, metformin (1000 mg bid) was discontinued with a regimen that included lantus 8 units daily, januvia daily.    He has no complaints today. The caregiver from the group home that is with him also has not major concerns. She states that he has not had urinary incontinence.    His sugars are not checked regularly. He has a CGM that reads \"high\".     Exam today  /75   Pulse 87   Temp 35.7 °C (96.2 °F) (Temporal)   Ht 1.626 m (5' 4\")   Wt 74.8 kg (165 lb)   SpO2 99%   BMI 28.32 kg/m²   Lungs clear BS  CV RR  Abd soft NT  Ext no edema    A/P    Agree with plan to check A1c today to see if improved regimen is managing his hyperglycemia.  HM due for colonoscopy in 2025 (scheduled)  Evaluation for urinary frequency/urinary incontinence did not reveal any new pathology; thought to be related to hyperglycemia/glucosuria.  RTC in 3 months.      I saw and evaluated the patient. I personally obtained the key and critical portions of the history and physical exam or was physically present for key and critical portions performed by the resident/fellow. I reviewed the resident/fellow's documentation and discussed the patient with the resident/fellow. I agree with the resident/fellow's medical decision making as documented in the note.    Sharri Terrell MD      "

## 2025-04-07 ENCOUNTER — NUTRITION (OUTPATIENT)
Dept: NUTRITION | Facility: HOSPITAL | Age: 70
End: 2025-04-07
Payer: MEDICARE

## 2025-04-07 VITALS — HEIGHT: 64 IN | BODY MASS INDEX: 28.32 KG/M2

## 2025-04-07 DIAGNOSIS — E11.9 TYPE 2 DIABETES MELLITUS WITHOUT COMPLICATION, WITHOUT LONG-TERM CURRENT USE OF INSULIN: Primary | ICD-10-CM

## 2025-04-07 PROCEDURE — 97802 MEDICAL NUTRITION INDIV IN: CPT

## 2025-04-07 NOTE — PROGRESS NOTES
"Nutrition Initial Assessment:     Patient Fabio Valles is a 69 y.o. male being seen at [facility] Brayden Turner who was referred by Dr. Leslee Ferrell on 1/21/25 for   1. Type 2 diabetes mellitus without complication, without long-term current use of insulin  Referral to Nutrition Services          Nutrition Assessment    There is no problem list on file for this patient.      Food & Nutrition Related History: Mr. Valles presents in office for nutrition counseling. Presents with Yovana, the . He has cut out soda and chips.       Allergies: None  Intolerance: None  Appetite: Good  Intake: >75%  GI Symptoms : None  Swallowing Difficulty: No problems with swallowing  Dentition : own  Food Preparation: Caregiver  Cooking Skills/Barriers: None reported  Grocery Shopping: Caregiver  Supplements: Denies   Food Insecurity: Lives in group home     Dietary Recall:   Meal 1: raisin bran or grits + sausage or sandra, coffee with sweet n low   Meal 2: BBQ ribs, dressing, greens, corn bread // sandwich + pudding   Meal 3: chicken or pork chops + vegetables + rice/starch     Snacks: chips, granola bars   Beverages: water, coffee   Alcohol Intake: none    Physical Activity  Walking and exercise videos     Diabetes Nutrition History:  Diagnosed in January 2025  Prior Nutrition Education: No   SMBG: Dexcom G7  Hypoglycemia: Occasional, but it has decreased since adjusting insulin doses   Current DM Medications/Insulin Regimen:  Lantus 8 units   Metformin 1000 mg BID  Januvia 100 mg daily     3/24/25-4/7/25  Average Glucose: 122 mg/dL   GMI: 6.2%     1% of time was spent > 250 mg/dL  6% of time was spent between 181-250 mg/dL  91% of time was spent between  mg/dL  1% of time was spent between 54-69 mg/dL   1% of time was spent < 54 mg/dL      Anthropometrics:  Ht Readings from Last 1 Encounters:   04/07/25 1.626 m (5' 4\")     BMI Readings from Last 1 Encounters:   04/07/25 28.32 kg/m²     Wt Readings from Last 10 " Encounters:   03/19/25 74.8 kg (165 lb)   03/10/25 73.5 kg (162 lb)   02/03/25 72.6 kg (160 lb)   01/07/25 79.4 kg (175 lb)   09/18/24 79.4 kg (175 lb)   03/29/24 80.3 kg (177 lb)   07/05/23 79.4 kg (175 lb)   04/13/23 80.5 kg (177 lb 8 oz)   03/15/23 81.4 kg (179 lb 8 oz)   03/09/23 77.6 kg (171 lb)       Nutrition Focused Physical Exam Findings:  Subcutaneous Fat Loss:   Defer Subcutaneous Fat Loss Assessment: Defer all  Defer All Reason: Visually appears well nourished with no signs of noticeable wasting    Muscle Wasting:  Defer Muscle Wasting Assessment: Defer all  Defer All Reason: Visually appears well nourished with no signs of noticeable wasting      Nutrition Significant Labs:  CMP Trend:    Recent Labs     02/03/25  1059 03/29/24  1637 07/05/23  1446 03/09/23  1601   GLUCOSE 385* 169* 82 178*    139 140 138   K 3.7 3.3* 3.6 3.2*    101 102 101   CO2 24 27 26 28   ANIONGAP  --  14 16 12   BUN 11 12 22 14   CREATININE 0.69* 0.76 0.80 0.75   EGFR 100 >90  --   --    CALCIUM 9.1 9.3 9.6 9.1   ALBUMIN 4.3 4.3 4.2 4.1   ALKPHOS  --  79 85 88   PROT  --  6.8 7.0 6.5   AST  --  12 13 13   BILITOT  --  0.5 0.4 0.6   ALT  --  12 9* 10     DM Specific Labs Trend (Includes HgbA1C, antibodies & fasting insulin):   Recent Labs     03/19/25  1417 01/07/25  1525 03/29/24  1637   HGBA1C 5.9* 14.9* 5.7*     Lipid Panel Trend:    Recent Labs     02/03/25  1059 03/09/23  1601 08/03/21  0903 11/06/20  1429   CHOL 115 113 96 117   HDL 35* 30.3* 36.3* 34.0*   LDLCALC 61  --   --   --    LDLF  --  59 50 54   VLDL  --  24 10 29   TRIG 100 120 51 145       Medications:  Current Outpatient Medications   Medication Instructions    amLODIPine (NORVASC) 10 mg, oral, Daily    atorvastatin (LIPITOR) 20 mg, oral, Nightly    Blood glucose monitoring (OneTouch Verio Flex Start) meter Check BG 2-3 times a day Substitute with any glucometer/strips/lancets covered by insurance    blood sugar diagnostic (OneTouch Verio test strips)  "strip Check BG 2-3 times a day Substitute with any glucometer/strips/lancets covered by insurance    blood-glucose sensor (Dexcom G7 Sensor) device Check BG 3-4 times    Dexcom G7  misc Use as instructed    insulin glargine (Lantus) 100 unit/mL (3 mL) pen Take as directed per insulin instructions. Take 8 units daily in the evening (starting 3/11)    lancets (OneTouch Delica Plus Lancet) 30 gauge misc Check BG 2-3 times a day Substitute with any glucometer/strips/lancets covered by insurance    losartan (COZAAR) 50 mg, oral, Daily    metFORMIN (GLUCOPHAGE) 1,000 mg, oral, 2 times daily (morning and late afternoon), Start the first week with taking 1,000mg in the morning and 500mg in the late afternoon, then after the 1st week if well tolerated- increase to 1,000mg in the morning and 1,000mg in the late afternoon    pen needle 1/2\" 29G X 12mm needle Use to inject 1-4 times daily as directed    risperiDONE (RISPERDAL) 0.5 mg, oral, Nightly    SITagliptin phosphate (JANUVIA) 100 mg, oral, Daily    tamsulosin (FLOMAX) 0.4 mg, oral, Daily    trospium (SANCTURA) 20 mg, oral, Nightly        Estimated Needs:  Total Energy Estimated Needs in 24 hours (kCal): 1850 kCal; Method for Estimating Needs: MSJ 1419 x 1.3  Total Protein Estimated Needs in 24 Hours (g): 75 g; Method for Estimating 24 Hour Protein Needs: 1 g/kg   ;     ;                 Nutrition Diagnosis   Malnutrition Diagnosis  Patient has Malnutrition Diagnosis: No    Nutrition Diagnosis  Patient has Nutrition Diagnosis: Yes  Diagnosis Status (1): New  Nutrition Diagnosis 1: Food and nutrition related knowledge deficit  Related to (1): limited or lack of prior nutrition-related education for T2DM  As Evidenced by (1): patient with newly diagnosed T2DM and in need of nutrition education/counseling       Nutrition Interventions/Recommendations   Nutrition Prescription: Oral nutrition General Healthy diet with focus on CHO control for DM management    Nutrition " Interventions:   Food and Nutrient Delivery: Meals & Snacks: Carbohydrate-modified diet  Goals: 45-60 grams total carb per meal, 15-30 grams total carb per snack (2 snacks daily)     Coordination of Care: Goals: N/A     Nutrition Education:   Content related nutrition education  Provided education on what happens in the body when prediabetes and diabetes develop. Explained why providing consistent amounts of carbohydrates in the diet is helpful for regulating blood sugar. Encouraged choosing foods that contain minimally processed complex carbohydrates, such as high fiber whole grains, beans, starchy vegetables, whole fruits. Simple sugars from fruit juice, sugar-sweetened beverages, and foods with added sugar should be limited in the diet. Also discussed how foods like protein, some fat, and non-starchy vegetables impact blood sugar regulation.  Provided education on Plate Method as a tool for preparing balanced meals. Discussed the following: Fill 1/2 plate with non-starchy vegetables (broccoli, carrots, cauliflower, salad greens, cucumbers, tomatoes). These foods contribute very few carbohydrates, and they add fiber to the meal. Fill 1/4 plate with lean protein (meat, turkey, fish, seafood, eggs, nuts, cheese, cottage cheese, nut butter, tofu, edamame). Fill 1/4 plate with carbohydrates/starches (grains, fruits, starchy vegetables, beans, yogurt, milk). Aim for at least half of daily grains as whole grains.   Provided education on carbohydrate (carb) counting. Discussed the following:  Foods and beverages that contribute carbohydrates (fruits, grains, legumes, milk, yogurt, starchy vegetables, sugar added to foods, sugar-sweetened beverages)  Foods that contribute no or minimal carbohydrates (protein, fats, non-starchy vegetables)  How to read a food label to count carbohydrates  Provided example meal plan.   Reviewed 15-15 Rule for Hypoglycemia (Low Blood Sugar): If BG is less than 70, then consume 15 grams of  carbohydrates (carbs) to raise your BG. Check BG again in 15 minutes. If it is still < 70 after 15 minutes, then consume an additional 15 grams of carbs.  Food and drink items that contain 15 grams of carbohydrates are the following: 3-4 glucose tablets, 4 oz. of juice or regular soda (not diet), 8 oz. of milk, 1 tbsp. of sugar, honey, or corn syrup, hard candy (check label for how much to eat). If you are experiencing symptoms of a low BG and you are not able to check your BG, treat the hypoglycemia. Symptoms of a low BG include shakiness, sweating, irritability, confusion, rapid heartbeat, blurred vision, clamminess.      Educational Handouts Provided: Example Meal Plan, ADA Plate Method,  Carbohydrate Counting, and ADA Hypoglycemia    Nutrition Counseling:     Nutrition Counseling Strategies : Nutrition counseling based on motivational interviewing strategy, Nutrition counseling based on goal setting strategy      Readiness to Change : Good  Level of Understanding : Good  Anticipated Compliance : Good         Nutrition Monitoring and Evaluation   Food and Nutrient Intake  Monitoring and Evaluation Plan: Meal/snack pattern  Meal/Snack Pattern: Estimated meal and snack pattern  Criteria: Monitor patient's progress towards meal and snack goal(s)              Biochemical Data, Medical Tests and Procedures  Monitoring and Evaluation Plan: Glucose/endocrine profile  Glucose/Endocrine Profile: Hemoglobin A1c (HgbA1c), Glucose, casual  Criteria: <7%, reduced instances of hypoglycemia              Follow Up: 3 months

## 2025-04-21 ENCOUNTER — OFFICE VISIT (OUTPATIENT)
Dept: ENDOCRINOLOGY | Facility: HOSPITAL | Age: 70
End: 2025-04-21
Payer: MEDICARE

## 2025-04-21 VITALS
HEART RATE: 84 BPM | SYSTOLIC BLOOD PRESSURE: 125 MMHG | HEIGHT: 64 IN | DIASTOLIC BLOOD PRESSURE: 71 MMHG | WEIGHT: 166 LBS | BODY MASS INDEX: 28.34 KG/M2 | TEMPERATURE: 96.3 F

## 2025-04-21 DIAGNOSIS — E11.9 TYPE 2 DIABETES MELLITUS WITHOUT COMPLICATION, WITHOUT LONG-TERM CURRENT USE OF INSULIN: Primary | ICD-10-CM

## 2025-04-21 DIAGNOSIS — I10 HYPERTENSION, UNSPECIFIED TYPE: ICD-10-CM

## 2025-04-21 DIAGNOSIS — E78.5 HYPERLIPIDEMIA, UNSPECIFIED HYPERLIPIDEMIA TYPE: ICD-10-CM

## 2025-04-21 LAB — GLUCOSE BLD MANUAL STRIP-MCNC: 85 MG/DL (ref 74–99)

## 2025-04-21 PROCEDURE — 3074F SYST BP LT 130 MM HG: CPT | Performed by: STUDENT IN AN ORGANIZED HEALTH CARE EDUCATION/TRAINING PROGRAM

## 2025-04-21 PROCEDURE — 99214 OFFICE O/P EST MOD 30 MIN: CPT | Mod: GC | Performed by: STUDENT IN AN ORGANIZED HEALTH CARE EDUCATION/TRAINING PROGRAM

## 2025-04-21 PROCEDURE — 1126F AMNT PAIN NOTED NONE PRSNT: CPT | Performed by: STUDENT IN AN ORGANIZED HEALTH CARE EDUCATION/TRAINING PROGRAM

## 2025-04-21 PROCEDURE — 4010F ACE/ARB THERAPY RXD/TAKEN: CPT | Performed by: STUDENT IN AN ORGANIZED HEALTH CARE EDUCATION/TRAINING PROGRAM

## 2025-04-21 PROCEDURE — 3078F DIAST BP <80 MM HG: CPT | Performed by: STUDENT IN AN ORGANIZED HEALTH CARE EDUCATION/TRAINING PROGRAM

## 2025-04-21 PROCEDURE — 82947 ASSAY GLUCOSE BLOOD QUANT: CPT | Performed by: STUDENT IN AN ORGANIZED HEALTH CARE EDUCATION/TRAINING PROGRAM

## 2025-04-21 PROCEDURE — 36416 COLLJ CAPILLARY BLOOD SPEC: CPT | Mod: GC

## 2025-04-21 PROCEDURE — 3008F BODY MASS INDEX DOCD: CPT | Performed by: STUDENT IN AN ORGANIZED HEALTH CARE EDUCATION/TRAINING PROGRAM

## 2025-04-21 PROCEDURE — 1036F TOBACCO NON-USER: CPT | Performed by: STUDENT IN AN ORGANIZED HEALTH CARE EDUCATION/TRAINING PROGRAM

## 2025-04-21 PROCEDURE — 3046F HEMOGLOBIN A1C LEVEL >9.0%: CPT | Performed by: STUDENT IN AN ORGANIZED HEALTH CARE EDUCATION/TRAINING PROGRAM

## 2025-04-21 PROCEDURE — 95251 CONT GLUC MNTR ANALYSIS I&R: CPT | Performed by: STUDENT IN AN ORGANIZED HEALTH CARE EDUCATION/TRAINING PROGRAM

## 2025-04-21 PROCEDURE — 1159F MED LIST DOCD IN RCRD: CPT | Performed by: STUDENT IN AN ORGANIZED HEALTH CARE EDUCATION/TRAINING PROGRAM

## 2025-04-21 PROCEDURE — 99214 OFFICE O/P EST MOD 30 MIN: CPT | Performed by: STUDENT IN AN ORGANIZED HEALTH CARE EDUCATION/TRAINING PROGRAM

## 2025-04-21 PROCEDURE — 1123F ACP DISCUSS/DSCN MKR DOCD: CPT | Performed by: STUDENT IN AN ORGANIZED HEALTH CARE EDUCATION/TRAINING PROGRAM

## 2025-04-21 RX ORDER — GLIPIZIDE 5 MG/1
5 TABLET, FILM COATED, EXTENDED RELEASE ORAL
Qty: 90 TABLET | Refills: 3 | Status: SHIPPED | OUTPATIENT
Start: 2025-04-21 | End: 2026-04-21

## 2025-04-21 ASSESSMENT — PATIENT HEALTH QUESTIONNAIRE - PHQ9
2. FEELING DOWN, DEPRESSED OR HOPELESS: NOT AT ALL
SUM OF ALL RESPONSES TO PHQ9 QUESTIONS 1 AND 2: 0
1. LITTLE INTEREST OR PLEASURE IN DOING THINGS: NOT AT ALL

## 2025-04-21 ASSESSMENT — ENCOUNTER SYMPTOMS
OCCASIONAL FEELINGS OF UNSTEADINESS: 0
DEPRESSION: 0
LOSS OF SENSATION IN FEET: 0

## 2025-04-21 ASSESSMENT — PAIN SCALES - GENERAL: PAINLEVEL_OUTOF10: 0-NO PAIN

## 2025-04-21 NOTE — PROGRESS NOTES
[facility]    HISTORY OF PRESENT ILLNESS   Fabio Valles is a 69 y.o. male with PMH of DLD, HTN, intellectual disability (lives at group home) presenting to endocrinology for follow up of DM2.  History from patient and supervisor from [facility].    Last seen in endocrinology in 03/2025, at which time his lantus was decreased from 15U to 8U and metformin was titrated up to 1000mg BID from 500mg BID    Today:  -Patient reports feeling well. Denies any increased thirst, urination, or blurry vision.   -POCT glucose 85 this AM    Current regimen:   Metformin 1000mg BID  Lantus 8 units in the evening  Januvia 100mg daily    Prior medications stopped due to side effects: none    Most recent HgbA1c: HbA1c of 5.9 in 3/2025 down from 14.9 in 1/2025  SMBG: Dexcom G7    90-day average - 139 mg/dl  30-day average 128 mg/dl  14-day average - 134 mg/dl  7-day average - 141 mg/dl  3-day average - 134 mg/dl     Hypoglycemia: no  Hypoglycemia awareness: no, patient will always say he feels fine    Weight changes:   Vitals:    04/21/25 0848   Weight: 75.3 kg (166 lb)        01/2025:  Wt 79.4 kg (175 lb)     Diet:  - Eating 3 meals a day, Lives at group home so patient does not have control over diet  Breakfast  Rice (sometimes sugary cereals), sometimes coffee now with sweet and low  Lunch: sandwich  Dinner: meat, two sides, veggie  Drink: water, diet juice  Snacks: granola bar  Previously had sugary snacks in his room, supervisors removed these     DM history:   Patient was seen by [facility] primary care on 09/18/2024. His primary complaint at this visit was urinary frequency/incontinence. He was treated with trospium and flomax and his metformin was discontinued since his sugars appeared to be at goal. He was seen again on 01/07/2025  with continued urinary incontinence and frequency. Labs ordered at this visit showed  Patient had virtual follow-up visit on 1/21/25 with [facility] primary care clinic. He was noted  to have polydipsia and polyuria and was drinking water, sugar free juices, denied other sxs at the time. His metformin 500 mg BID was restarted at this visit and he was referred to nutrition. HbA1c of 14.9% (01/07/2025) increased from 5.7% in 03/2024. Started on lantus 15 and januvia 100 (with metformin continued at same dose).    Last eye exam: February 2025, just got new glasses, goes to [facility] (supervisor denies retinopathy)  Last foot exam: every 9 weeks    Review of Systems   Negative for fevers/chills, fatigue, weight changes, shortness of breath, nausea, vomiting, abdominal pain, numbness/tingling, and leg swelling     MEDICATIONS       Current Outpatient Medications:     amLODIPine (Norvasc) 10 mg tablet, TAKE 1 TAB BY MOUTH ONCE DAILY, Disp: 20 tablet, Rfl: 10    atorvastatin (Lipitor) 20 mg tablet, TAKE 1 TAB BY MOUTH EVERY DAY AT BEDTIME, Disp: 20 tablet, Rfl: 10    Blood glucose monitoring (OneTouch Verio Flex Start) meter, Check BG 2-3 times a day Substitute with any glucometer/strips/lancets covered by insurance, Disp: 1 kit, Rfl: 1    blood sugar diagnostic (OneTouch Verio test strips) strip, Check BG 2-3 times a day Substitute with any glucometer/strips/lancets covered by insurance, Disp: 200 strip, Rfl: 3    blood-glucose sensor (Dexcom G7 Sensor) device, Check BG 3-4 times, Disp: 3 each, Rfl: 11    Dexcom G7  misc, Use as instructed, Disp: 1 each, Rfl: 0    glipiZIDE XL (Glucotrol XL) 5 mg 24 hr tablet, Take 1 tablet (5 mg) by mouth once daily with breakfast. Do not crush, chew, or split., Disp: 90 tablet, Rfl: 3    lancets (OneTouch Delica Plus Lancet) 30 gauge misc, Check BG 2-3 times a day Substitute with any glucometer/strips/lancets covered by insurance, Disp: 200 each, Rfl: 11    losartan (Cozaar) 25 mg tablet, Take 2 tablets (50 mg) by mouth once daily., Disp: 60 tablet, Rfl: 11    metFORMIN (Glucophage) 500 mg tablet, Take 2 tablets (1,000 mg) by mouth 2 times daily  "(morning and late afternoon). Start the first week with taking 1,000mg in the morning and 500mg in the late afternoon, then after the 1st week if well tolerated- increase to 1,000mg in the morning and 1,000mg in the late afternoon, Disp: 120 tablet, Rfl: 13    pen needle 1/2\" 29G X 12mm needle, Use to inject 1-4 times daily as directed, Disp: 100 each, Rfl: 3    risperiDONE (RisperDAL) 0.5 mg tablet, Take 1 tablet (0.5 mg) by mouth once daily at bedtime., Disp: 30 tablet, Rfl: 11    SITagliptin phosphate (Januvia) 100 mg tablet, Take 1 tablet (100 mg) by mouth once daily., Disp: 90 tablet, Rfl: 3    tamsulosin (Flomax) 0.4 mg 24 hr capsule, Take 1 capsule (0.4 mg) by mouth once daily., Disp: 30 capsule, Rfl: 11    trospium (Sanctura) 20 mg tablet, TAKE 1 TAB BY MOUTH EVERY DAY AT BEDTIME, Disp: 20 tablet, Rfl: 10     HISTORY     PMH: per above    No Known Allergies     Social history:   - Denies alcohol use   - Denies tobacco use   - Denies recreational drug use   - Lives with: group home  - Enjoys going to [employer] and going out    PHYSICAL EXAM     Visit Vitals  /71   Pulse 84   Temp 35.7 °C (96.3 °F) (Temporal)   Ht 1.626 m (5' 4\")   Wt 75.3 kg (166 lb)   BMI 28.49 kg/m²   Smoking Status Never   BSA 1.84 m²      Constitutional: Alert and oriented, NAD  HEENT: NC/AT, no scleral icterus  Cardiovascular: RRR, no MRG  Respiratory: CTAB, no wheezes, rales, or rhonchi   Skin: warm, well-perfused, no rashes or lesions   Extremities: no lower extremity edema     LABS        Lab Results   Component Value Date    TSH 1.25 03/09/2023      Lab Results   Component Value Date    HGBA1C 5.9 (H) 03/19/2025    HGBA1C 14.9 (H) 01/07/2025    HGBA1C 5.7 (H) 03/29/2024     Lab Results   Component Value Date    LDLCALC 61 02/03/2025    CREATININE 0.69 (L) 02/03/2025     Lab Results   Component Value Date    GLUCOSE 385 (H) 02/03/2025    CALCIUM 9.1 02/03/2025     02/03/2025    K 3.7 02/03/2025    CO2 24 02/03/2025     " 02/03/2025    BUN 11 02/03/2025    CREATININE 0.69 (L) 02/03/2025       ASSESSMENT AND PLAN   Fabio Valles is a 69 y.o. male with PMH of DLD, HTN, intellectual disability (lives at group home) presenting to endocrinology for follow up of DM2.     #T2DM  A1c in 1/2025 was 14.9, previously 5.7 last March 2024. Patient had previously been on metformin prior to A1c of 5.7 when it was stopped, and patient developed symptomatic hyperglycemia. Metformin resumed and patient was started on lantus 15 and Januvia in 01/2025. Sugars under better control and hyperglycemia symptoms resolved. Will stop insulin glargine and start glipizide XL 5mg daily.     - Lipids: LDL 61 (02/2025) on atorvastatin 20  - BP: 125/71, well controlled  - Urine Alb/Cr: wnl (02/2025), on losartan 50mg  - Recent A1c: 14.9 (01/2025)    Plan   - stop insulin glargine completely   - start glipizide XL 5mg daily   - continue metformin 1000mg daily   - continue januvia 100mg daily   - continue follow-up with ophthalmology and podiatry     Follow-up in 3 months     Patient seen and discussed with Dr. Saavedra.     Marcos Haji MD   Internal Medicine, PGY-3  [facility]

## 2025-04-21 NOTE — PATIENT INSTRUCTIONS
Please stop the insulin. We will start a new pill called glipizide once daily in the morning.     Please continue to take the metformin, Januvia, and the new pill called glipizide.     We will follow up in 3 months.

## 2025-05-22 ENCOUNTER — TELEPHONE (OUTPATIENT)
Dept: CARE COORDINATION | Facility: CLINIC | Age: 70
End: 2025-05-22
Payer: MEDICARE

## 2025-05-22 NOTE — PROGRESS NOTES
Spoke with caregiver at group home about the patient's blood pressure. Caregiver stated that he is taking his medications as prescribed with no issues. His blood pressure is taken at the group home every Mon/Wed/Fri and readings have been normal. He is scheduled for his next follow-up visit.

## 2025-06-01 DIAGNOSIS — Z12.11 COLON CANCER SCREENING: Primary | ICD-10-CM

## 2025-06-01 RX ORDER — POLYETHYLENE GLYCOL 3350, SODIUM CHLORIDE, SODIUM BICARBONATE, POTASSIUM CHLORIDE 420; 11.2; 5.72; 1.48 G/4L; G/4L; G/4L; G/4L
4000 POWDER, FOR SOLUTION ORAL ONCE
Qty: 4000 ML | Refills: 0 | Status: SHIPPED | OUTPATIENT
Start: 2025-06-01 | End: 2025-06-01

## 2025-06-13 ENCOUNTER — HOSPITAL ENCOUNTER (OUTPATIENT)
Dept: GASTROENTEROLOGY | Facility: HOSPITAL | Age: 70
Discharge: HOME | End: 2025-06-13
Payer: MEDICARE

## 2025-06-13 VITALS
HEIGHT: 63 IN | BODY MASS INDEX: 29.77 KG/M2 | SYSTOLIC BLOOD PRESSURE: 119 MMHG | WEIGHT: 168 LBS | DIASTOLIC BLOOD PRESSURE: 93 MMHG | HEART RATE: 87 BPM | OXYGEN SATURATION: 96 % | TEMPERATURE: 97.8 F | RESPIRATION RATE: 20 BRPM

## 2025-06-13 DIAGNOSIS — Z00.00 HEALTHCARE MAINTENANCE: ICD-10-CM

## 2025-06-13 LAB — GLUCOSE BLD MANUAL STRIP-MCNC: 84 MG/DL (ref 74–99)

## 2025-06-13 PROCEDURE — G0105 COLORECTAL SCRN; HI RISK IND: HCPCS | Performed by: INTERNAL MEDICINE

## 2025-06-13 PROCEDURE — 3700000013 HC SEDATION LEVEL 5+ TIME - EACH ADDITIONAL 15 MINUTES

## 2025-06-13 PROCEDURE — 3700000012 HC SEDATION LEVEL 5+ TIME - INITIAL 15 MINUTES 5/> YEARS

## 2025-06-13 PROCEDURE — 2500000004 HC RX 250 GENERAL PHARMACY W/ HCPCS (ALT 636 FOR OP/ED): Performed by: INTERNAL MEDICINE

## 2025-06-13 PROCEDURE — 82947 ASSAY GLUCOSE BLOOD QUANT: CPT

## 2025-06-13 PROCEDURE — G0105 COLORECTAL SCRN; HI RISK IND: HCPCS | Mod: 74 | Performed by: INTERNAL MEDICINE

## 2025-06-13 PROCEDURE — 7100000009 HC PHASE TWO TIME - INITIAL BASE CHARGE

## 2025-06-13 PROCEDURE — G0500 MOD SEDAT ENDO SERVICE >5YRS: HCPCS

## 2025-06-13 PROCEDURE — 99153 MOD SED SAME PHYS/QHP EA: CPT

## 2025-06-13 PROCEDURE — G0500 MOD SEDAT ENDO SERVICE >5YRS: HCPCS | Performed by: INTERNAL MEDICINE

## 2025-06-13 PROCEDURE — 7100000010 HC PHASE TWO TIME - EACH INCREMENTAL 1 MINUTE

## 2025-06-13 RX ORDER — FENTANYL CITRATE 50 UG/ML
INJECTION, SOLUTION INTRAMUSCULAR; INTRAVENOUS AS NEEDED
Status: COMPLETED | OUTPATIENT
Start: 2025-06-13 | End: 2025-06-13

## 2025-06-13 RX ORDER — MIDAZOLAM HYDROCHLORIDE 1 MG/ML
INJECTION, SOLUTION INTRAMUSCULAR; INTRAVENOUS AS NEEDED
Status: COMPLETED | OUTPATIENT
Start: 2025-06-13 | End: 2025-06-13

## 2025-06-13 RX ADMIN — FENTANYL CITRATE 50 MCG: 50 INJECTION, SOLUTION INTRAMUSCULAR; INTRAVENOUS at 10:27

## 2025-06-13 RX ADMIN — MIDAZOLAM 2 MG: 1 INJECTION INTRAMUSCULAR; INTRAVENOUS at 10:27

## 2025-06-13 ASSESSMENT — PAIN SCALES - GENERAL
PAINLEVEL_OUTOF10: 0 - NO PAIN
PAINLEVEL_OUTOF10: 5 - MODERATE PAIN
PAINLEVEL_OUTOF10: 0 - NO PAIN

## 2025-06-13 ASSESSMENT — ENCOUNTER SYMPTOMS
COUGH: 0
UNEXPECTED WEIGHT CHANGE: 0
DIARRHEA: 0
HEADACHES: 0
BLOOD IN STOOL: 0
CONSTIPATION: 0
JOINT SWELLING: 0
ARTHRALGIAS: 0
SHORTNESS OF BREATH: 0
ABDOMINAL DISTENTION: 0
NAUSEA: 0
DIZZINESS: 0
SPEECH DIFFICULTY: 0
VOMITING: 0
TROUBLE SWALLOWING: 0
COLOR CHANGE: 0
CHILLS: 0
ABDOMINAL PAIN: 0
LIGHT-HEADEDNESS: 0
SLEEP DISTURBANCE: 0
FEVER: 0
CONFUSION: 0
WHEEZING: 0
DIFFICULTY URINATING: 0

## 2025-06-13 ASSESSMENT — COLUMBIA-SUICIDE SEVERITY RATING SCALE - C-SSRS
1. IN THE PAST MONTH, HAVE YOU WISHED YOU WERE DEAD OR WISHED YOU COULD GO TO SLEEP AND NOT WAKE UP?: NO
2. HAVE YOU ACTUALLY HAD ANY THOUGHTS OF KILLING YOURSELF?: NO
6. HAVE YOU EVER DONE ANYTHING, STARTED TO DO ANYTHING, OR PREPARED TO DO ANYTHING TO END YOUR LIFE?: NO

## 2025-06-13 NOTE — DISCHARGE INSTRUCTIONS

## 2025-06-13 NOTE — H&P
History Of Present Illness  Fabio Valles is a 69 y.o. male presenting with personal history of colon polyps.     Past Medical History  Medical History[1]  Surgical History  Surgical History[2]  Social History  He reports that he has never smoked. He has never used smokeless tobacco. He reports that he does not drink alcohol and does not use drugs.    Family History  Family History[3]     Allergies  Allergies[4]  Review of Systems   Constitutional:  Negative for chills, fever and unexpected weight change.   HENT:  Negative for congestion and trouble swallowing.    Respiratory:  Negative for cough, shortness of breath and wheezing.    Cardiovascular:  Negative for chest pain.   Gastrointestinal:  Negative for abdominal distention, abdominal pain, blood in stool, constipation, diarrhea, nausea and vomiting.   Genitourinary:  Negative for difficulty urinating.   Musculoskeletal:  Negative for arthralgias and joint swelling.   Skin:  Negative for color change.   Neurological:  Negative for dizziness, speech difficulty, light-headedness and headaches.   Psychiatric/Behavioral:  Negative for confusion and sleep disturbance.      Pre-sedation Evaluation:  ASA Classification - ASA 2 - Patient with mild systemic disease with no functional limitations  Mallampati Score - IV (only hard palate visible)    Physical Exam  Constitutional:       General: He is awake.      Appearance: Normal appearance.   HENT:      Head: Normocephalic and atraumatic.      Nose: Nose normal.      Mouth/Throat:      Mouth: Mucous membranes are moist.   Eyes:      Extraocular Movements: Extraocular movements intact.   Neck:      Thyroid: No thyroid mass.      Trachea: Phonation normal.   Cardiovascular:      Rate and Rhythm: Normal rate and regular rhythm.   Pulmonary:      Effort: Pulmonary effort is normal. No respiratory distress.      Breath sounds: Normal air entry. No decreased breath sounds, wheezing, rhonchi or rales.   Abdominal:       "General: Bowel sounds are normal. There is no distension.      Palpations: Abdomen is soft.      Tenderness: There is no abdominal tenderness.   Musculoskeletal:      Cervical back: Neck supple.      Right lower leg: No edema.      Left lower leg: No edema.   Skin:     General: Skin is warm.      Capillary Refill: Capillary refill takes less than 2 seconds.   Neurological:      General: No focal deficit present.      Mental Status: He is alert and oriented to person, place, and time. Mental status is at baseline.      Cranial Nerves: Cranial nerves 2-12 are intact.      Motor: Motor function is intact.   Psychiatric:         Attention and Perception: Attention and perception normal.         Mood and Affect: Mood normal.         Speech: Speech normal.         Behavior: Behavior normal.          Last Recorded Vitals  Blood pressure 135/72, pulse 90, temperature 36.6 °C (97.8 °F), temperature source Temporal, resp. rate 17, height 1.6 m (5' 3\"), weight 76.2 kg (168 lb), SpO2 100%.     Assessment/Plan   Proceed with surveillance colonoscopy with moderate sedation.     PTA/Current Medications:  Prescriptions Prior to Admission[5]  Current Medications[6]  Glory Banks MD         [1]   Past Medical History:  Diagnosis Date    Diabetes (Multi)     HTN (hypertension)    [2] History reviewed. No pertinent surgical history.  [3] No family history on file.  [4] No Known Allergies  [5] (Not in a hospital admission)   [6]   Current Outpatient Medications   Medication Sig Dispense Refill    amLODIPine (Norvasc) 10 mg tablet TAKE 1 TAB BY MOUTH ONCE DAILY 20 tablet 10    losartan (Cozaar) 25 mg tablet Take 2 tablets (50 mg) by mouth once daily. 60 tablet 11    metFORMIN (Glucophage) 500 mg tablet Take 2 tablets (1,000 mg) by mouth 2 times daily (morning and late afternoon). Start the first week with taking 1,000mg in the morning and 500mg in the late afternoon, then after the 1st week if well tolerated- increase to 1,000mg in the " "morning and 1,000mg in the late afternoon 120 tablet 13    atorvastatin (Lipitor) 20 mg tablet TAKE 1 TAB BY MOUTH EVERY DAY AT BEDTIME 20 tablet 10    Blood glucose monitoring (OneTouch Verio Flex Start) meter Check BG 2-3 times a day Substitute with any glucometer/strips/lancets covered by insurance 1 kit 1    blood sugar diagnostic (OneTouch Verio test strips) strip Check BG 2-3 times a day Substitute with any glucometer/strips/lancets covered by insurance 200 strip 3    blood-glucose sensor (Dexcom G7 Sensor) device Check BG 3-4 times 3 each 11    Dexcom G7  misc Use as instructed 1 each 0    glipiZIDE XL (Glucotrol XL) 5 mg 24 hr tablet Take 1 tablet (5 mg) by mouth once daily with breakfast. Do not crush, chew, or split. 90 tablet 3    lancets (OneTouch Delica Plus Lancet) 30 gauge misc Check BG 2-3 times a day Substitute with any glucometer/strips/lancets covered by insurance 200 each 11    pen needle 1/2\" 29G X 12mm needle Use to inject 1-4 times daily as directed 100 each 3    risperiDONE (RisperDAL) 0.5 mg tablet Take 1 tablet (0.5 mg) by mouth once daily at bedtime. 30 tablet 11    SITagliptin phosphate (Januvia) 100 mg tablet Take 1 tablet (100 mg) by mouth once daily. 90 tablet 3    tamsulosin (Flomax) 0.4 mg 24 hr capsule Take 1 capsule (0.4 mg) by mouth once daily. 30 capsule 11    trospium (Sanctura) 20 mg tablet TAKE 1 TAB BY MOUTH EVERY DAY AT BEDTIME 20 tablet 10     No current facility-administered medications for this encounter.     "

## 2025-06-16 NOTE — PATIENT INSTRUCTIONS
Dear  Mr Valles,    Thank you for choosing [facility] for your care needs. It was a pleasure to serve you. Today, we discussed the following:    Colonoscopy 9/2025, need to repeat prep, please discuss with gI team how best to facilitate this    Blood sugar looks good keep taking your medications    Please continue your blood pressure medications and check 3x per week and bring the results to clinic    Referrals:      Health maintenance: Please get the Zoster (shingles at your pharmacy)      Follow up: 3 months      Thank you so much for coming to the clinic today. It was very nice to meet you. If you have any questions please call our office at [phone] to talk to one of our physicians or schedule an appointment. Our fax number is [phone]. If your issue cannot wait until the next business day, please go to urgent care or the emergency department.     Onel Padilla MD  Internal Medicine/Pediatrics, PGY-2 resident physician      If You smoke or use other tobacco products, take steps to quit. Call [phone] for more information or to set up an appointment with  [facility]. The [place] Tobacco Quit Line is a free resource for people who don’t have insurance, receive Medicaid, pregnant women, or members of the [place] [facility]. Call 7-596-QUIT-NOW or 1-749.398.9513.    I also strongly urge all of my patients to register for [facility] by going to: [email or web address]  (The  staff can also send you a text/email link to register when you check out).

## 2025-06-16 NOTE — PROGRESS NOTES
Subjective   Patient ID: Fabio Valles is a 69 y.o. male who presents for Follow-up.  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 Attempted to call colleague who knows what meds he takes but no answer  Wondering about colonoscopy, had in June but incomplete prep,  unsure what happened (if issue with bowel prep or something else), per last gI note supposed to schedule for spet 2025  No other concerns today  No illnesses     Foot exam  Next Endo 7/2025, last A1c 5.9% 3/2025  -Was on regimen of lantus 8 at bedtime, januvia 100 daily, metformin 1000mg bid   -Last seen by Endo 4/2025 - stopped Lantus 8 and started Glipizide XL 5 mg qD   - says no refills needed  -has dexcom - avg over 14 days 129, A1c 6.4%, 89% in range  Has upcoming podiatry apt      145/73 128/72  Weight 74 to 76 kg    BP - amlodipine and losartan, no changes, they do have cuff at the group home, Summary says BP have been good    Exercise - day program has equipment, does treadmill  Diet - careful with sugar    Colonoscopy? GI 9/2025      Review of Systems   Constitutional:  Negative for appetite change.   HENT:  Negative for ear discharge and sore throat.    Eyes:  Negative for visual disturbance.   Respiratory:  Negative for shortness of breath.    Cardiovascular:  Negative for chest pain.   Gastrointestinal:  Negative for abdominal pain, constipation and diarrhea.   Genitourinary:  Negative for difficulty urinating.        Few episodes nocturia   Musculoskeletal:  Negative for arthralgias and joint swelling.   Skin:  Negative for rash.   Neurological:  Negative for dizziness.        No falls   Psychiatric/Behavioral:          No behavior concerns         Medical History[1]  Surgical History[2]  Family History[3]  Medication Documentation Review Audit       Reviewed by Nichole Chowdary MA (Medical Assistant) on 06/17/25 at  "0816      Medication Order Taking? Sig Documenting Provider Last Dose Status   amLODIPine (Norvasc) 10 mg tablet [id number] No TAKE 1 TAB BY MOUTH ONCE DAILY Riddhi Kumar MD 6/13/2025 Morning Active   atorvastatin (Lipitor) 20 mg tablet [id number]  TAKE 1 TAB BY MOUTH EVERY DAY AT BEDTIME Riddhi Kumar MD  Active   Blood glucose monitoring (OneTouch Verio Flex Start) meter [id number]  Check BG 2-3 times a day Substitute with any glucometer/strips/lancets covered by insurance Ella Saavedra MD  Active   blood sugar diagnostic (OneTouch Verio test strips) strip [id number]  Check BG 2-3 times a day Substitute with any glucometer/strips/lancets covered by insurance Ella Saavedra MD  Active   blood-glucose sensor (Dexcom G7 Sensor) device [id number]  Check BG 3-4 times Ella Saavedra MD  Active   Dexcom G7  misc [id number]  Use as instructed Ella Saavedra MD  Active   glipiZIDE XL (Glucotrol XL) 5 mg 24 hr tablet [id number]  Take 1 tablet (5 mg) by mouth once daily with breakfast. Do not crush, chew, or split. Ella Saavedra MD  Active   lancets (OneTouch Delica Plus Lancet) 30 gauge misc [id number]  Check BG 2-3 times a day Substitute with any glucometer/strips/lancets covered by insurance Ella Saavedra MD  Active   losartan (Cozaar) 25 mg tablet [id number] No Take 2 tablets (50 mg) by mouth once daily. Lexa Rees MD 6/13/2025 Morning Active   metFORMIN (Glucophage) 500 mg tablet [id number] No Take 2 tablets (1,000 mg) by mouth 2 times daily (morning and late afternoon). Start the first week with taking 1,000mg in the morning and 500mg in the late afternoon, then after the 1st week if well tolerated- increase to 1,000mg in the morning and 1,000mg in the late afternoon Laura Hobbs MD 6/13/2025 Morning Active   pen needle 1/2\" 29G X 12mm needle [id number]  Use to inject 1-4 times daily as directed Ella Saavedra MD  Active   risperiDONE (RisperDAL) 0.5 mg tablet [id number]  Take 1 tablet " (0.5 mg) by mouth once daily at bedtime. Blanquita Ricci MD   25 [id number]   SITagliptin phosphate (Januvia) 100 mg tablet [id number]  Take 1 tablet (100 mg) by mouth once daily. Ella Saavedra MD  Active   tamsulosin (Flomax) 0.4 mg 24 hr capsule [id number]  Take 1 capsule (0.4 mg) by mouth once daily. Lexa Rees MD  Active   trospium (Sanctura) 20 mg tablet [id number]  TAKE 1 TAB BY MOUTH EVERY DAY AT BEDTIME Remedios Reese MD  Active                  Allergies[4]  Social Drivers of Health     Tobacco Use: Low Risk  (2025)    Patient History     Smoking Tobacco Use: Never     Smokeless Tobacco Use: Never     Passive Exposure: Not on file   Alcohol Use: Not on file   Financial Resource Strain: Not on File (2021)    Received from [employer]    Financial Resource Strain     Financial Resource Strain: 0   Food Insecurity: Not on File (2024)    Received from [employer]    Food Insecurity     Food: 0   Transportation Needs: Not on File (2021)    Received from [employer]    Transportation Needs     Transportation: 0   Physical Activity: Not on File (2021)    Received from [employer]    Physical Activity     Physical Activity: 0   Stress: Not on File (2021)    Received from [employer]    Stress     Stress: 0   Social Connections: Not on File (2024)    Received from [employer]    Social Connections     Connectedness: 0   Intimate Partner Violence: Not on file   Depression: Not at risk (2025)    PHQ-2     PHQ-2 Score: 0   Housing Stability: Not on File (2021)    Received from [employer]    Housing Stability     Housin   Utilities: Not on file   Digital Equity: Not on file   Health Literacy: Not on file       Objective   Visit Vitals  /73   Pulse 84   Temp 35.7 °C (96.3 °F) (Temporal)      Physical Exam  Gen: NAD, alert and oriented, resting comfortably on exam table  HEENT: PERRL, EOMI, no oral lesions, MMM, TM intact b/l  Neck: supple, no masses  Cardiac: RRR. No murmurs, rubs, or  gallops  Resp: CTA b/l, no rales or ronchi  Abd: soft, nontender to palpation, no HSM, normal bowel sounds  MSK: Strength and ROM grossly intact b/l  Neuro: CN 2-12 intact b/l, sensation to light touch intact  Skin: No rash or bruising  Lymph: No edema  Vascular: Radial and pedal pulses 2+ b/l  Psych: Normal mood and affect  Foot exam: Passed, no abnormal sensation, few calluses on bottom of feet but no ulcers      Results for orders placed or performed in visit on 06/17/25 (from the past 24 hours)   POCT GLUCOSE   Result Value Ref Range    POCT Glucose 121 (H) 74 - 99 mg/dL          Assessment/Plan          Fabio Valles is a 69 y.o. male with hx of dyslipidemia, HTN, DM, intellectual disability (lives at a group home), pedophilia, and intermittent explosive disorder (follows w psych) who presents to the clinic for FUV.  Overall doing well, BG at goal and BP relatively at goal on therapy, encouraging pt to check BP three times per week and report to our office. Encouraged to get RSV and shingles vaccines at pharmacy. Last colonoscopy incomplete prep so will need repeat, planned for Sept 2025, attempted to discuss with group home what issue with prep was however no staff presented who helped pt with prep the first time. Handout given on prep and encouraged to make apt with GI prior to next colonoscopy for close guidance on prep process    #Urinary Urgency - resolved   -Several episodes of nocturia but not bothersome, able to fall back asleep  PLAN:   - Continue Trospium at night time   -  continue Tamulosin 0.4mg nightly     #HTN   -bp controlled today at 128/72  PLAN:   -c/w amlodipine 10mg and losartan 50mg    -note: took chlorthalidone 25mg daily in the PAST, was discontinued at prior visit       #Dyslipidemia   - Lipids panel: T chol 115, HDL 35, LDL 61,  (2/2025)   - Cont atorvastatin 20 mg      #DM    -follows w endocrine   -A1c 5.9% (3/2025)    PLAN:   - Recheck A1C today   -Current regimen of lantus  8 at bedtime, januvia 100 daily, metformin 1000mg bid   -Last seen by Endo 4/2025 - stopped Lantus 8 and started Glipizide XL 5 mg qD   -Last eye exam Feb 2025, Last Micro alb: Cr 6 2/2025   -Last foot exam in office 6/17, does have upcoming podiatry visit    #Hearing loss  -Follows w ENT, last seen in April 2023: 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 Patient is on anti-HTN and statin medications per guidelines. Advised patient to emphasize whole food diet and limit processed food intake.      -Diabetes: A1C 5.7 (3/2025)   -Cancer:    Colorectal (tubular adenoma x3 2020): Last 6/2025, incomplete prep, repeat 9/11/2025  Lung: N/A     #Behavioral Counseling:    Tobacco/smoking: Never smoker   Alcohol: Never drinker   Diet/exercise: No exercise, diet balanced     #Immunizations:   - Pneumococcal: [id number] dose completed 3/2020   - Flu: Will get at home pharmacy along with Shingrix and RSV   - COVID: Primary series completed   - RSV: Will consider at pharmacy today   - Tdap: Up to date, 2021   - Zoster: Encouraged to get at pharmacy     #Infectious    -HIV nonreactive    -Hepatitis B/C panel negative     Follow up: 3 months  Staffed with Onel Andrade MD PGY-3  Internal Medicine-Pediatrics         [1]   Past Medical History:  Diagnosis Date    Diabetes (Multi)     HTN (hypertension)    [2] No past surgical history on file.  [3] No family history on file.  [4] No Known Allergies

## 2025-06-17 ENCOUNTER — OFFICE VISIT (OUTPATIENT)
Dept: PRIMARY CARE | Facility: HOSPITAL | Age: 70
End: 2025-06-17
Payer: MEDICARE

## 2025-06-17 VITALS
BODY MASS INDEX: 30.12 KG/M2 | HEART RATE: 84 BPM | OXYGEN SATURATION: 98 % | SYSTOLIC BLOOD PRESSURE: 128 MMHG | DIASTOLIC BLOOD PRESSURE: 72 MMHG | TEMPERATURE: 96.3 F | WEIGHT: 170 LBS | HEIGHT: 63 IN

## 2025-06-17 DIAGNOSIS — E11.9 TYPE 2 DIABETES MELLITUS WITHOUT COMPLICATION, WITHOUT LONG-TERM CURRENT USE OF INSULIN: Primary | ICD-10-CM

## 2025-06-17 DIAGNOSIS — I10 HYPERTENSION, UNSPECIFIED TYPE: ICD-10-CM

## 2025-06-17 LAB — GLUCOSE BLD MANUAL STRIP-MCNC: 121 MG/DL (ref 74–99)

## 2025-06-17 PROCEDURE — 1159F MED LIST DOCD IN RCRD: CPT

## 2025-06-17 PROCEDURE — 3008F BODY MASS INDEX DOCD: CPT

## 2025-06-17 PROCEDURE — 3078F DIAST BP <80 MM HG: CPT

## 2025-06-17 PROCEDURE — 3074F SYST BP LT 130 MM HG: CPT

## 2025-06-17 PROCEDURE — 82947 ASSAY GLUCOSE BLOOD QUANT: CPT

## 2025-06-17 PROCEDURE — G2211 COMPLEX E/M VISIT ADD ON: HCPCS

## 2025-06-17 PROCEDURE — 99213 OFFICE O/P EST LOW 20 MIN: CPT | Mod: GC

## 2025-06-17 PROCEDURE — 3046F HEMOGLOBIN A1C LEVEL >9.0%: CPT

## 2025-06-17 PROCEDURE — 4010F ACE/ARB THERAPY RXD/TAKEN: CPT

## 2025-06-17 PROCEDURE — 1126F AMNT PAIN NOTED NONE PRSNT: CPT

## 2025-06-17 PROCEDURE — 1036F TOBACCO NON-USER: CPT

## 2025-06-17 PROCEDURE — 99213 OFFICE O/P EST LOW 20 MIN: CPT

## 2025-06-17 ASSESSMENT — ENCOUNTER SYMPTOMS
OCCASIONAL FEELINGS OF UNSTEADINESS: 0
ABDOMINAL PAIN: 0
DIZZINESS: 0
JOINT SWELLING: 0
APPETITE CHANGE: 0
DIFFICULTY URINATING: 0
LOSS OF SENSATION IN FEET: 0
SHORTNESS OF BREATH: 0
ARTHRALGIAS: 0
CONSTIPATION: 0
DIARRHEA: 0
SORE THROAT: 0
DEPRESSION: 0

## 2025-06-17 ASSESSMENT — PAIN SCALES - GENERAL: PAINLEVEL_OUTOF10: 0-NO PAIN

## 2025-06-17 NOTE — PROGRESS NOTES
I saw and evaluated the patient. I personally obtained the key and critical portions of the history and physical exam or was physically present for key and critical portions performed by the resident/fellow. I reviewed the resident/fellow's documentation and discussed the patient with the resident/fellow. I agree with the resident/fellow's medical decision making as documented in the note.    Gena Larson MD

## 2025-06-23 ENCOUNTER — APPOINTMENT (OUTPATIENT)
Dept: NUTRITION | Facility: HOSPITAL | Age: 70
End: 2025-06-23
Payer: MEDICARE

## 2025-07-02 ENCOUNTER — TELEPHONE (OUTPATIENT)
Dept: PRIMARY CARE | Facility: HOSPITAL | Age: 70
End: 2025-07-02

## 2025-07-02 NOTE — TELEPHONE ENCOUNTER
Patient needs Glucose monitor instructions changed to PRN instead of checking 3 times daily.  Also wants to talk about blood sugar dropping, please call them at [phone]

## 2025-07-14 ENCOUNTER — APPOINTMENT (OUTPATIENT)
Dept: ENDOCRINOLOGY | Facility: HOSPITAL | Age: 70
End: 2025-07-14
Payer: MEDICARE

## 2025-07-14 VITALS
WEIGHT: 174.7 LBS | DIASTOLIC BLOOD PRESSURE: 75 MMHG | TEMPERATURE: 98 F | HEIGHT: 63 IN | HEART RATE: 98 BPM | SYSTOLIC BLOOD PRESSURE: 139 MMHG | BODY MASS INDEX: 30.95 KG/M2

## 2025-07-14 DIAGNOSIS — I49.9 IRREGULAR HEART RATE: ICD-10-CM

## 2025-07-14 DIAGNOSIS — E11.9 TYPE 2 DIABETES MELLITUS WITHOUT COMPLICATION, WITHOUT LONG-TERM CURRENT USE OF INSULIN: ICD-10-CM

## 2025-07-14 DIAGNOSIS — E16.2 HYPOGLYCEMIA: Primary | ICD-10-CM

## 2025-07-14 PROCEDURE — 93005 ELECTROCARDIOGRAM TRACING: CPT | Performed by: STUDENT IN AN ORGANIZED HEALTH CARE EDUCATION/TRAINING PROGRAM

## 2025-07-14 PROCEDURE — 99215 OFFICE O/P EST HI 40 MIN: CPT | Mod: GC,25 | Performed by: STUDENT IN AN ORGANIZED HEALTH CARE EDUCATION/TRAINING PROGRAM

## 2025-07-14 RX ORDER — IBUPROFEN 200 MG
16 TABLET ORAL AS NEEDED
Qty: 50 TABLET | Refills: 12 | Status: SHIPPED | OUTPATIENT
Start: 2025-07-14 | End: 2026-07-14

## 2025-07-14 RX ORDER — GLIPIZIDE 2.5 MG/1
2.5 TABLET, EXTENDED RELEASE ORAL
Qty: 90 TABLET | Refills: 3 | Status: SHIPPED | OUTPATIENT
Start: 2025-07-14 | End: 2026-07-14

## 2025-07-14 RX ORDER — BLOOD-GLUCOSE METER
EACH MISCELLANEOUS
Qty: 200 STRIP | Refills: 3 | Status: SHIPPED | OUTPATIENT
Start: 2025-07-14

## 2025-07-14 ASSESSMENT — PAIN SCALES - GENERAL: PAINLEVEL_OUTOF10: 0-NO PAIN

## 2025-07-14 ASSESSMENT — ENCOUNTER SYMPTOMS
LOSS OF SENSATION IN FEET: 0
DEPRESSION: 0
OCCASIONAL FEELINGS OF UNSTEADINESS: 0

## 2025-07-14 NOTE — PROGRESS NOTES
"    HPI:  Fabio Valles is a 69 y.o. male with PMH of DLD, HTN, intellectual disability (lives at group home) presenting to endocrinology for follow up of DM2.     Patient and group home staff seen in clinic. Reports some intermittent low BG with worst being 7/1/25 as low as 50 after dinner. Patient does not report symptoms.    Denies polyuria, dysuria, LH, palpitations, CP.    Medications:  Current Outpatient Medications   Medication Instructions    amLODIPine (NORVASC) 10 mg, oral, Daily    atorvastatin (LIPITOR) 20 mg, oral, Nightly    Blood glucose monitoring (OneTouch Verio Flex Start) meter Check BG 2-3 times a day Substitute with any glucometer/strips/lancets covered by insurance    blood sugar diagnostic (OneTouch Verio test strips) strip Check BG 2-3 times a day Substitute with any glucometer/strips/lancets covered by insurance    blood-glucose sensor (Dexcom G7 Sensor) device Check BG 3-4 times    Dexcom G7  misc Use as instructed    glipiZIDE XL (GLUCOTROL XL) 5 mg, oral, Daily with breakfast, Do not crush, chew, or split.    lancets (OneTouch Delica Plus Lancet) 30 gauge misc Check BG 2-3 times a day Substitute with any glucometer/strips/lancets covered by insurance    losartan (COZAAR) 50 mg, oral, Daily    metFORMIN (GLUCOPHAGE) 1,000 mg, oral, 2 times daily (morning and late afternoon), Start the first week with taking 1,000mg in the morning and 500mg in the late afternoon, then after the 1st week if well tolerated- increase to 1,000mg in the morning and 1,000mg in the late afternoon    pen needle 1/2\" 29G X 12mm needle Use to inject 1-4 times daily as directed    risperiDONE (RISPERDAL) 0.5 mg, oral, Nightly    SITagliptin phosphate (JANUVIA) 100 mg, oral, Daily    tamsulosin (FLOMAX) 0.4 mg, oral, Daily    trospium (SANCTURA) 20 mg, oral, Nightly       Allergies:  RX Allergies[1]    Past medical history:  Medical History[2]    Surgical history:  Surgical History[3]    Family " history:  Family History[4]    Social history:   reports that he has never smoked. He has never used smokeless tobacco. He reports that he does not drink alcohol and does not use drugs.    Health maintenance:  Health Maintenance   Topic Date Due    Pneumococcal Vaccine (2 of 2 - PPSV23, PCV20, or PCV21) 05/05/2020    Diabetes: Retinopathy Screening  03/12/2022    Diabetes: Hemoglobin A1C  06/19/2025    Influenza Vaccine (1) 09/01/2025    Lipid Panel  02/03/2026    Diabetes: Urine Protein Screening  02/03/2026    Hepatitis B Vaccines  Aged Out       Review of systems:  Review of Systems     Vitals:  Vitals:    07/14/25 0925   BP: 139/75   Pulse: 98   Temp: 36.7 °C (98 °F)         Physical exam:  General: well appearing, NAD, pleasant  HEENT: NCAT, MMM  CV: irregular on exam  PULM: CTAB, non-labored respirations   ABD: soft, NT, ND, + bowel sounds   : no suprapubic or CVA tenderness   EXT: WWP, no significant edema   SKIN: no rashes noted   NEURO: A&Ox4, no gross motor or sensory deficits, normal gait  PSYCH: pleasant mood, appropriate affect      Labs:  Lab Results   Component Value Date    WBC 6.3 03/29/2024    HGB 13.5 03/29/2024    HCT 38.4 (L) 03/29/2024    MCV 89 03/29/2024     03/29/2024       Lab Results   Component Value Date    GLUCOSE 385 (H) 02/03/2025    CALCIUM 9.1 02/03/2025     02/03/2025    K 3.7 02/03/2025    CO2 24 02/03/2025     02/03/2025    BUN 11 02/03/2025    CREATININE 0.69 (L) 02/03/2025       Lab Results   Component Value Date    HGBA1C 5.9 (H) 03/19/2025        Lab Results   Component Value Date    CHOL 115 02/03/2025    CHOL 113 03/09/2023    CHOL 96 08/03/2021     Lab Results   Component Value Date    HDL 35 (L) 02/03/2025    HDL 30.3 (A) 03/09/2023    HDL 36.3 (A) 08/03/2021     Lab Results   Component Value Date    LDLCALC 61 02/03/2025     Lab Results   Component Value Date    TRIG 100 02/03/2025    TRIG 120 03/09/2023    TRIG 51 08/03/2021     No components found  "for: \"CHOLHDL\"    Imaging:  Imaging  No results found.    Cardiology, Vascular, and Other Imaging  No other imaging results found for the past 7 days      Assessment and plan:  Fabio Valles is a 69 y.o. male with PMH of DLD, HTN, intellectual disability (lives at group home) presenting to endocrinology for follow up of DM2.     #T2DM  :: had A1C of ~15 in January, diet control via group home staff and insulin A1C dropped to 5.9 in March  :: currently off insulin  :: some lows, with one being as low as 50 on 7/1/25  :: Urine Alb/Cr: wnl (02/2025), on losartan 50mg   Plan:  - c/w diet control via home staff  - c/w Metformin 1000mg BID  - c/w sitagliptin 100mg daily  - dropping glipizide XR from 5mg to 2.5mg iso lows    #Irregular HR on exam  Plan:  - EKG    #DLD  :: chol 115, HDL 35, LDL 61  Plan:  - c/w atorva 20      Follow Up:    Follow-up in 4 months.    Patient and plan discussed with attending physician Dr. Saavedra.    Nicole Llanes MD          [1] No Known Allergies  [2]   Past Medical History:  Diagnosis Date    Diabetes (Multi)     HTN (hypertension)    [3] History reviewed. No pertinent surgical history.  [4] No family history on file.    "

## 2025-07-14 NOTE — PATIENT INSTRUCTIONS
Decrease glipizide to 2.5 mg once daily  Continue Januvia and Metformin  Monitor BG with DEXCOM   If BG < 70 on dexcom check fingerstick if below 70 give 16 g of sugar tabs and recheck fingerstick after 15 min and if still low give another 16 g of sugar  If remains low go to ED  Continue losartan and lipitor  RTC in 4 months

## 2025-07-15 DIAGNOSIS — E11.9 TYPE 2 DIABETES MELLITUS WITHOUT COMPLICATION, WITHOUT LONG-TERM CURRENT USE OF INSULIN: ICD-10-CM

## 2025-07-15 RX ORDER — INSULIN PUMP SYRINGE, 3 ML
EACH MISCELLANEOUS
Qty: 1 KIT | Refills: 1 | Status: SHIPPED | OUTPATIENT
Start: 2025-07-15

## 2025-07-15 NOTE — PROGRESS NOTES
Spoke with care taker about updating One Touch Lancet / Glucose monitoring Med Instructions to PRN for Glucose <70.  -Mediations Updated

## 2025-08-07 DIAGNOSIS — R39.15 URINARY URGENCY: ICD-10-CM

## 2025-08-07 RX ORDER — TROSPIUM CHLORIDE 20 MG/1
20 TABLET, FILM COATED ORAL NIGHTLY
Qty: 30 TABLET | Refills: 3 | Status: SHIPPED | OUTPATIENT
Start: 2025-08-07

## 2025-09-04 RX ORDER — BLOOD-GLUCOSE,RECEIVER,CONT
EACH MISCELLANEOUS
Qty: 1 EACH | Refills: 0 | Status: SHIPPED | OUTPATIENT
Start: 2025-09-04

## 2025-11-10 ENCOUNTER — APPOINTMENT (OUTPATIENT)
Dept: ENDOCRINOLOGY | Facility: CLINIC | Age: 70
End: 2025-11-10
Payer: MEDICARE